# Patient Record
Sex: FEMALE | Race: WHITE | Employment: FULL TIME | URBAN - METROPOLITAN AREA
[De-identification: names, ages, dates, MRNs, and addresses within clinical notes are randomized per-mention and may not be internally consistent; named-entity substitution may affect disease eponyms.]

---

## 2017-04-11 ENCOUNTER — APPOINTMENT (OUTPATIENT)
Dept: OTHER | Age: 52
End: 2017-04-11
Attending: PREVENTIVE MEDICINE

## 2017-04-14 ENCOUNTER — APPOINTMENT (OUTPATIENT)
Dept: OTHER | Age: 52
End: 2017-04-14
Attending: PREVENTIVE MEDICINE

## 2018-02-27 ENCOUNTER — HOSPITAL ENCOUNTER (OUTPATIENT)
Age: 53
Discharge: HOME | End: 2018-02-27
Attending: FAMILY MEDICINE
Payer: COMMERCIAL

## 2018-02-27 ENCOUNTER — APPOINTMENT (OUTPATIENT)
Dept: GENERAL RADIOLOGY | Age: 53
End: 2018-02-27
Attending: FAMILY MEDICINE
Payer: COMMERCIAL

## 2018-02-27 VITALS
HEART RATE: 94 BPM | SYSTOLIC BLOOD PRESSURE: 108 MMHG | OXYGEN SATURATION: 94 % | DIASTOLIC BLOOD PRESSURE: 70 MMHG | TEMPERATURE: 101 F | RESPIRATION RATE: 20 BRPM

## 2018-02-27 DIAGNOSIS — J18.9 PNEUMONIA OF LEFT LUNG DUE TO INFECTIOUS ORGANISM, UNSPECIFIED PART OF LUNG: Primary | ICD-10-CM

## 2018-02-27 PROCEDURE — 71046 X-RAY EXAM CHEST 2 VIEWS: CPT | Performed by: FAMILY MEDICINE

## 2018-02-27 PROCEDURE — 99213 OFFICE O/P EST LOW 20 MIN: CPT

## 2018-02-27 PROCEDURE — 99214 OFFICE O/P EST MOD 30 MIN: CPT

## 2018-02-27 RX ORDER — OSELTAMIVIR PHOSPHATE 75 MG/1
75 CAPSULE ORAL 2 TIMES DAILY
Qty: 10 CAPSULE | Refills: 0 | Status: SHIPPED | OUTPATIENT
Start: 2018-02-27 | End: 2018-03-04

## 2018-02-27 RX ORDER — ACETAMINOPHEN 500 MG
1000 TABLET ORAL ONCE
Status: COMPLETED | OUTPATIENT
Start: 2018-02-27 | End: 2018-02-27

## 2018-02-27 RX ORDER — LEVOFLOXACIN 750 MG/1
750 TABLET ORAL DAILY
Qty: 7 TABLET | Refills: 0 | Status: SHIPPED | OUTPATIENT
Start: 2018-02-27 | End: 2018-03-06

## 2018-02-27 NOTE — ED PROVIDER NOTES
Patient Seen in: [address] [facility] [place]    History   Patient presents with:  Cough/URI    Stated Complaint: coughing fever body aches fatigue head pain    HPI    78-year-old female presented with chief complaint of fevers, sore throat, body ache Otherwise no respiratory distress, lungs clear to auscultation bilaterally.              ED Course   Labs Reviewed - No data to display    ED Course as of Feb 27 1608  ------------------------------------------------------------  Xr Chest Pa + Lat Chest (cp Impression:  Pneumonia of left lung due to infectious organism, unspecified part of lung  (primary encounter diagnosis)    Disposition:  Discharge  2/27/2018 10:05 am    Follow-up:  pcp    In 3 days          Medications Prescribed:  Discharge Medication Drake Weinberg

## 2018-03-02 ENCOUNTER — OFFICE VISIT (OUTPATIENT)
Dept: FAMILY MEDICINE CLINIC | Facility: CLINIC | Age: 53
End: 2018-03-02

## 2018-03-02 VITALS
HEART RATE: 64 BPM | WEIGHT: 149 LBS | HEIGHT: 62 IN | TEMPERATURE: 99 F | DIASTOLIC BLOOD PRESSURE: 80 MMHG | BODY MASS INDEX: 27.42 KG/M2 | RESPIRATION RATE: 16 BRPM | SYSTOLIC BLOOD PRESSURE: 118 MMHG

## 2018-03-02 DIAGNOSIS — J18.9 PNEUMONIA OF LEFT LOWER LOBE DUE TO INFECTIOUS ORGANISM: Primary | ICD-10-CM

## 2018-03-02 PROCEDURE — 99203 OFFICE O/P NEW LOW 30 MIN: CPT | Performed by: FAMILY MEDICINE

## 2018-03-02 RX ORDER — OMEPRAZOLE 40 MG/1
CAPSULE, DELAYED RELEASE ORAL
COMMUNITY
Start: 2017-05-22

## 2018-03-02 RX ORDER — ESTRADIOL 0.04 MG/D
0.38 FILM, EXTENDED RELEASE TRANSDERMAL
COMMUNITY
Start: 2017-06-08

## 2018-03-02 NOTE — PROGRESS NOTES
CC: pneumonia    HPI:     Location left lower   Quality consolidated  Severity moderate  Duration about a week  Context simultaneous treatment for flu  Associated symptoms feeling better    ROS:   GENERAL HEALTH: fevers noted - helped with tylenol  SKIN: d

## 2018-03-05 ENCOUNTER — TELEPHONE (OUTPATIENT)
Dept: FAMILY MEDICINE CLINIC | Facility: CLINIC | Age: 53
End: 2018-03-05

## 2018-03-05 NOTE — TELEPHONE ENCOUNTER
Patient seen 3/2/18 for pneumonia  Work note pended with dates 3/2/18 through Thursday with return date of Friday. Please advise.

## 2018-03-05 NOTE — TELEPHONE ENCOUNTER
Pt called stated she saw Dr. Samual Pallas on Friday and was wondering if she could get a note for work she has to be off of work all this week but can go back on Friday. PT would like us to fax this to her employer. Pt will call back with fax number.

## 2018-03-05 NOTE — TELEPHONE ENCOUNTER
Spoke with patient - read note to patient. Per patient - ok to fax. Patient is to be off work from 3/2/18 - 3/9/18  Patient has an appointment with Dr. Duc Naylor on 3/9 to see if she is good to go back to work.   Note faxed to [phone] attn Georgiana Buchanan

## 2018-03-05 NOTE — TELEPHONE ENCOUNTER
Pt calling back with fax number [phone] [employer]. Pt would like a call back and let her know what the letter states before faxing it to her employer.

## 2018-03-07 ENCOUNTER — TELEPHONE (OUTPATIENT)
Dept: FAMILY MEDICINE CLINIC | Facility: CLINIC | Age: 53
End: 2018-03-07

## 2018-03-07 NOTE — TELEPHONE ENCOUNTER
Pt called stated she saw Dr Noemy Buitrago last Friday for f/u pneumonia from the [address] [place] [address] and she did schedule a f/u for this Friday but he did tell her if her temp started up again this week to call. She does have a temp today of 100.

## 2018-03-07 NOTE — TELEPHONE ENCOUNTER
Per patient, has had normal temp since Thursday, until today. No new symptoms, had been feeling better but noticed today was tired again. Finished antibiotic Monday. Moved patient follow up to tomorrow. Patient will go to IC/ER if gets worse.   Future Eliud

## 2018-03-08 ENCOUNTER — OFFICE VISIT (OUTPATIENT)
Dept: FAMILY MEDICINE CLINIC | Facility: CLINIC | Age: 53
End: 2018-03-08

## 2018-03-08 ENCOUNTER — HOSPITAL ENCOUNTER (OUTPATIENT)
Dept: GENERAL RADIOLOGY | Age: 53
Discharge: HOME | End: 2018-03-08
Attending: FAMILY MEDICINE
Payer: COMMERCIAL

## 2018-03-08 VITALS
HEART RATE: 75 BPM | TEMPERATURE: 98 F | OXYGEN SATURATION: 96 % | HEIGHT: 62 IN | DIASTOLIC BLOOD PRESSURE: 72 MMHG | WEIGHT: 149 LBS | RESPIRATION RATE: 20 BRPM | BODY MASS INDEX: 27.42 KG/M2 | SYSTOLIC BLOOD PRESSURE: 108 MMHG

## 2018-03-08 DIAGNOSIS — J18.9 PNEUMONIA OF LEFT LOWER LOBE DUE TO INFECTIOUS ORGANISM: Primary | ICD-10-CM

## 2018-03-08 DIAGNOSIS — R50.81 FEVER IN OTHER DISEASES: ICD-10-CM

## 2018-03-08 DIAGNOSIS — J18.9 PNEUMONIA OF LEFT LOWER LOBE DUE TO INFECTIOUS ORGANISM: ICD-10-CM

## 2018-03-08 PROCEDURE — 99213 OFFICE O/P EST LOW 20 MIN: CPT | Performed by: FAMILY MEDICINE

## 2018-03-08 PROCEDURE — 71046 X-RAY EXAM CHEST 2 VIEWS: CPT | Performed by: FAMILY MEDICINE

## 2018-03-08 NOTE — PROGRESS NOTES
CC: fever    HPI:   Location spiked fever  Quality sudden  Severity moderate >100  Duration since yesterday  Context recently finished treatment for pneumonia    ROS: no vomiting or diarrhea, minimal chest pains, occ sob    Past Medical History:   [address]

## 2018-03-19 ENCOUNTER — OFFICE VISIT (OUTPATIENT)
Dept: FAMILY MEDICINE CLINIC | Facility: CLINIC | Age: 53
End: 2018-03-19

## 2018-03-19 VITALS
SYSTOLIC BLOOD PRESSURE: 92 MMHG | BODY MASS INDEX: 28 KG/M2 | RESPIRATION RATE: 16 BRPM | TEMPERATURE: 97 F | HEART RATE: 86 BPM | DIASTOLIC BLOOD PRESSURE: 58 MMHG | WEIGHT: 153 LBS | OXYGEN SATURATION: 97 %

## 2018-03-19 DIAGNOSIS — J06.9 VIRAL UPPER RESPIRATORY TRACT INFECTION: Primary | ICD-10-CM

## 2018-03-19 PROCEDURE — 99213 OFFICE O/P EST LOW 20 MIN: CPT | Performed by: FAMILY MEDICINE

## 2018-03-19 NOTE — PROGRESS NOTES
CC: congestion    HPI:    Location nasal  Quality pressure, drainage  Severity moderate  Duration several days  Associated symptoms some intermittent sore throat, some hoarse voice    ROS: no fever, some pain in chest on coughing, no sputum or hemoptysis,

## 2018-03-24 ENCOUNTER — HOSPITAL ENCOUNTER (OUTPATIENT)
Age: 53
Discharge: HOME | End: 2018-03-24
Attending: FAMILY MEDICINE
Payer: COMMERCIAL

## 2018-03-24 ENCOUNTER — APPOINTMENT (OUTPATIENT)
Dept: GENERAL RADIOLOGY | Age: 53
End: 2018-03-24
Attending: FAMILY MEDICINE
Payer: COMMERCIAL

## 2018-03-24 ENCOUNTER — APPOINTMENT (OUTPATIENT)
Dept: CT IMAGING | Age: 53
End: 2018-03-24
Attending: FAMILY MEDICINE
Payer: COMMERCIAL

## 2018-03-24 VITALS
TEMPERATURE: 98 F | RESPIRATION RATE: 16 BRPM | BODY MASS INDEX: 28 KG/M2 | WEIGHT: 153 LBS | DIASTOLIC BLOOD PRESSURE: 70 MMHG | OXYGEN SATURATION: 97 % | HEART RATE: 76 BPM | SYSTOLIC BLOOD PRESSURE: 120 MMHG

## 2018-03-24 DIAGNOSIS — J18.9 MULTIFOCAL PNEUMONIA: Primary | ICD-10-CM

## 2018-03-24 DIAGNOSIS — E04.1 THYROID NODULE: ICD-10-CM

## 2018-03-24 LAB
#LYMPHOCYTE IC: 1.4 X10ˆ3/UL (ref 0.9–3.2)
#MXD IC: 0.7 X10ˆ3/UL (ref 0.1–1)
#NEUTROPHIL IC: 4.7 X10ˆ3/UL (ref 1.3–6.7)
CREAT SERPL-MCNC: 0.7 MG/DL (ref 0.55–1.02)
GLUCOSE BLD-MCNC: 101 MG/DL (ref 70–99)
HCT IC: 37.2 % (ref 37–54)
HGB IC: 12.6 G/DL (ref 11.7–16)
ISTAT BLOOD GAS TCO2: 28 MMOL/L (ref 22–32)
ISTAT BUN: 11 MG/DL (ref 8–20)
ISTAT CHLORIDE: 101 MMOL/L (ref 101–111)
ISTAT HEMATOCRIT: 37 % (ref 34–50)
ISTAT IONIZED CALCIUM: 1.18 MMOL/L (ref 1.12–1.32)
ISTAT POTASSIUM: 4.2 MMOL/L (ref 3.6–5.1)
ISTAT SODIUM: 139 MMOL/L (ref 136–144)
LYMPHOCYTES NFR BLD AUTO: 20.8 %
MCH IC: 30.9 PG (ref 27–33.2)
MCHC IC: 33.9 G/DL (ref 31–37)
MCV IC: 91.2 FL (ref 81–100)
MIXED CELL %: 10.4 %
NEUTROPHILS NFR BLD AUTO: 68.8 %
PLT IC: 230 X10ˆ3/UL (ref 150–450)
RBC IC: 4.08 X10ˆ6/UL (ref 3.8–5.1)
WBC IC: 6.8 X10ˆ3/UL (ref 4–13)

## 2018-03-24 PROCEDURE — 99214 OFFICE O/P EST MOD 30 MIN: CPT

## 2018-03-24 PROCEDURE — 85025 COMPLETE CBC W/AUTO DIFF WBC: CPT | Performed by: FAMILY MEDICINE

## 2018-03-24 PROCEDURE — 80047 BASIC METABLC PNL IONIZED CA: CPT

## 2018-03-24 PROCEDURE — 36415 COLL VENOUS BLD VENIPUNCTURE: CPT

## 2018-03-24 PROCEDURE — 71046 X-RAY EXAM CHEST 2 VIEWS: CPT | Performed by: FAMILY MEDICINE

## 2018-03-24 PROCEDURE — 71260 CT THORAX DX C+: CPT | Performed by: FAMILY MEDICINE

## 2018-03-24 RX ORDER — BENZONATATE 200 MG/1
200 CAPSULE ORAL 3 TIMES DAILY PRN
Qty: 30 CAPSULE | Refills: 0 | Status: SHIPPED | OUTPATIENT
Start: 2018-03-24 | End: 2018-05-14 | Stop reason: ALTCHOICE

## 2018-03-24 RX ORDER — DOXYCYCLINE 100 MG/1
100 CAPSULE ORAL 2 TIMES DAILY
Qty: 10 CAPSULE | Refills: 0 | Status: SHIPPED | OUTPATIENT
Start: 2018-03-24 | End: 2018-03-24

## 2018-03-24 RX ORDER — AMOXICILLIN AND CLAVULANATE POTASSIUM 562.5; 437.5; 62.5 MG/1; MG/1; MG/1
TABLET, FILM COATED, EXTENDED RELEASE ORAL
Qty: 20 TABLET | Refills: 0 | Status: SHIPPED | OUTPATIENT
Start: 2018-03-24 | End: 2018-03-24

## 2018-03-24 RX ORDER — DOXYCYCLINE 100 MG/1
100 CAPSULE ORAL 2 TIMES DAILY
Qty: 10 CAPSULE | Refills: 0 | Status: SHIPPED | OUTPATIENT
Start: 2018-03-24 | End: 2018-03-29

## 2018-03-24 RX ORDER — BENZONATATE 200 MG/1
200 CAPSULE ORAL 3 TIMES DAILY PRN
Qty: 30 CAPSULE | Refills: 0 | Status: SHIPPED | OUTPATIENT
Start: 2018-03-24 | End: 2018-03-24

## 2018-03-24 RX ORDER — AMOXICILLIN AND CLAVULANATE POTASSIUM 562.5; 437.5; 62.5 MG/1; MG/1; MG/1
TABLET, FILM COATED, EXTENDED RELEASE ORAL
Qty: 20 TABLET | Refills: 0 | Status: SHIPPED | OUTPATIENT
Start: 2018-03-24 | End: 2018-03-30

## 2018-03-24 NOTE — ED INITIAL ASSESSMENT (HPI)
Pt had pneumonia at the end of February. Pt has followed up with her pcp 3 times since the dx and was told the pneumonia was improved/resolved and she may now have a viral cold. Pt taking otc medications this past week without relief.  The cough she now has

## 2018-03-24 NOTE — ED PROVIDER NOTES
Patient Seen in: [address] [facility]    History   Patient presents with:  Cough/URI    Stated Complaint: congestion, cough,    HPI    18-year-old female presents to the immediate care today with chief complaints of a persistent and nonproduct Resp 16   Wt 69.4 kg   SpO2 97%   BMI 27.98 kg/m²         Physical Exam    General appearance: alert, appears stated age and cooperative  Head: Normocephalic, without obvious abnormality, atraumatic  Eyes: conjunctivae/corneas clear. PERRL, EOM's intact.  Severo Pastures effusions. BONES:  Normal for age. CONCLUSION:  Left perihilar consolidation extending into the superior segment left lower lobe.      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 CONCLUSION:  1. Multiple patchy airspace opacities within bilateral upper lobes and superior segment left lower lobe with mild confluence of some of the opacities.   The appea DO  [facility]  [phone]    In 1 week  For re-check        Medications Prescribed:  Current Discharge Medication List    START taking these medications    Amoxicillin-Pot Clavulanate ER (AUGMENTIN XR) 1000-62.5 MG Oral Tablet 1

## 2018-03-26 NOTE — ED NOTES
Pt called and informed us that she is coughing up a lot of secretions, if she is short of breath to seek care sooner, she has a appt with Dr. Zeus Emerson in 4 days.

## 2018-03-28 ENCOUNTER — HOSPITAL ENCOUNTER (OUTPATIENT)
Age: 53
Discharge: HOME | End: 2018-03-28
Payer: COMMERCIAL

## 2018-03-28 VITALS
DIASTOLIC BLOOD PRESSURE: 66 MMHG | HEIGHT: 63 IN | WEIGHT: 150 LBS | SYSTOLIC BLOOD PRESSURE: 133 MMHG | TEMPERATURE: 98 F | HEART RATE: 63 BPM | OXYGEN SATURATION: 98 % | BODY MASS INDEX: 26.58 KG/M2 | RESPIRATION RATE: 18 BRPM

## 2018-03-28 DIAGNOSIS — J18.9 MULTIFOCAL PNEUMONIA: Primary | ICD-10-CM

## 2018-03-28 DIAGNOSIS — R06.2 WHEEZING: ICD-10-CM

## 2018-03-28 PROCEDURE — 99214 OFFICE O/P EST MOD 30 MIN: CPT

## 2018-03-28 PROCEDURE — 94640 AIRWAY INHALATION TREATMENT: CPT

## 2018-03-28 RX ORDER — IPRATROPIUM BROMIDE AND ALBUTEROL SULFATE 2.5; .5 MG/3ML; MG/3ML
3 SOLUTION RESPIRATORY (INHALATION) ONCE
Status: COMPLETED | OUTPATIENT
Start: 2018-03-28 | End: 2018-03-28

## 2018-03-28 RX ORDER — PREDNISONE 10 MG/1
TABLET ORAL
Qty: 30 TABLET | Refills: 0 | Status: SHIPPED | OUTPATIENT
Start: 2018-03-28 | End: 2018-04-06

## 2018-03-28 RX ORDER — ALBUTEROL SULFATE 2.5 MG/3ML
2.5 SOLUTION RESPIRATORY (INHALATION) EVERY 4 HOURS PRN
Qty: 30 AMPULE | Refills: 0 | Status: SHIPPED | OUTPATIENT
Start: 2018-03-28 | End: 2018-04-27

## 2018-03-28 NOTE — ED PROVIDER NOTES
Patient Seen in: [address] [facility]    History   Patient presents with:  Cough/URI  Pneumonia    Stated Complaint: still coughing not feeling better since last visit    HPI    CHIEF COMPLAINT: Continued cough     HISTORY OF PRESENT ILLNESS: and social history elements is as listed in today's nurse's notes are reviewed and agree. The patient's family history is reviewed and is noncontributory to the presenting problem, except as indicated as above.     Past Medical History:   Diagnosis Date   • masses. Neurological:  Grossly intact, no deficits. Cranial nerves are intact, 5 out of 5 symmetric upper and lower extremity motor strength. Gait normal.  Skin:  warm and dry, no rashes. No jaundice.  Brisk capillary refill  Musculoskeletal: neck is sup cough and had a fever yesterday. FINDINGS:  LUNGS:  Cardiac silhouette and pulmonary vasculature within normal limits. Improvement of left hilar increased opacity which extends to the lower lobe.   No new focal consolidation, pleural effusion, or pneumo [employer]) NRDR ([address]) which includes the Dose Index Registry. PATIENT STATED HISTORY:(As transcribed by Technologist)  Recurring pneumonia for one month. States wet/dry cough with chest congestion. No current fever. Biajn Cai DO            ED Course as of Mar 28 1114  ------------------------------------------------------------       [facility]       This is a well-appearing 64-year-old female with a recent diagnosis of pneumonia, who presents with complaint of persistent cough

## 2018-03-28 NOTE — ED INITIAL ASSESSMENT (HPI)
Cough seems a little worse since last visit, patient states is more harsh. And sometimes can hear wheezing from her upper chest. Not sleeping well. Cough spasms and feels like it 'takes her breath away'. No fevers.  Is still taking antibiotics as prescribed

## 2018-03-30 ENCOUNTER — OFFICE VISIT (OUTPATIENT)
Dept: FAMILY MEDICINE CLINIC | Facility: CLINIC | Age: 53
End: 2018-03-30

## 2018-03-30 VITALS
HEIGHT: 62 IN | WEIGHT: 151.38 LBS | TEMPERATURE: 98 F | HEART RATE: 66 BPM | RESPIRATION RATE: 16 BRPM | DIASTOLIC BLOOD PRESSURE: 68 MMHG | SYSTOLIC BLOOD PRESSURE: 102 MMHG | BODY MASS INDEX: 27.86 KG/M2 | OXYGEN SATURATION: 96 %

## 2018-03-30 DIAGNOSIS — J18.9 MULTIFOCAL PNEUMONIA: Primary | ICD-10-CM

## 2018-03-30 DIAGNOSIS — J98.01 BRONCHOSPASM: ICD-10-CM

## 2018-03-30 PROCEDURE — 99214 OFFICE O/P EST MOD 30 MIN: CPT | Performed by: FAMILY MEDICINE

## 2018-03-30 RX ORDER — CEFUROXIME AXETIL 500 MG/1
TABLET ORAL
Refills: 0 | COMMUNITY
Start: 2018-03-24 | End: 2018-04-06

## 2018-03-30 NOTE — PROGRESS NOTES
CC: cough    HPI:     Pt under treatment for multifocal pneumonia. She is using abx, po steroids, albuterol nebs. Cough very slow to improve. Moderate in severity. No associated hemoptysis. She is tired from all of this.        ROS: sleep better the last 2

## 2018-04-06 ENCOUNTER — OFFICE VISIT (OUTPATIENT)
Dept: FAMILY MEDICINE CLINIC | Facility: CLINIC | Age: 53
End: 2018-04-06

## 2018-04-06 VITALS
TEMPERATURE: 98 F | HEART RATE: 69 BPM | HEIGHT: 62 IN | OXYGEN SATURATION: 98 % | SYSTOLIC BLOOD PRESSURE: 112 MMHG | WEIGHT: 151 LBS | BODY MASS INDEX: 27.79 KG/M2 | RESPIRATION RATE: 18 BRPM | DIASTOLIC BLOOD PRESSURE: 72 MMHG

## 2018-04-06 DIAGNOSIS — E04.1 THYROID NODULE: ICD-10-CM

## 2018-04-06 DIAGNOSIS — J18.9 MULTIFOCAL PNEUMONIA: Primary | ICD-10-CM

## 2018-04-06 PROCEDURE — 99214 OFFICE O/P EST MOD 30 MIN: CPT | Performed by: FAMILY MEDICINE

## 2018-04-06 NOTE — PROGRESS NOTES
CC: follow up pneumonia and cough; ct scan result    HPI:   Overall she is starting to feel better. She has finished all oral medications.      CT:   Location rt hemithyroid  Quality nodular density  Severity at least 1cm  Duration newly discovered      ROS

## 2018-04-09 ENCOUNTER — HOSPITAL ENCOUNTER (OUTPATIENT)
Dept: ULTRASOUND IMAGING | Age: 53
Discharge: HOME | End: 2018-04-09
Attending: FAMILY MEDICINE
Payer: COMMERCIAL

## 2018-04-09 DIAGNOSIS — E04.1 THYROID NODULE: ICD-10-CM

## 2018-04-09 PROCEDURE — 76536 US EXAM OF HEAD AND NECK: CPT | Performed by: FAMILY MEDICINE

## 2018-04-10 DIAGNOSIS — E04.1 THYROID NODULE: Primary | ICD-10-CM

## 2018-04-10 NOTE — PROGRESS NOTES
Call tell the thyroid has a nodule seen on ultrasound that needs to be biopsied. I have ordered this procedure to be done in the interventional radiology department at [facility]. She just needs to call and schedule.  When biopsy results are back we wi

## 2018-04-13 ENCOUNTER — HOSPITAL ENCOUNTER (OUTPATIENT)
Age: 53
Discharge: HOME | End: 2018-04-13
Attending: FAMILY MEDICINE
Payer: COMMERCIAL

## 2018-04-13 VITALS
RESPIRATION RATE: 16 BRPM | HEART RATE: 78 BPM | DIASTOLIC BLOOD PRESSURE: 82 MMHG | WEIGHT: 150 LBS | SYSTOLIC BLOOD PRESSURE: 109 MMHG | TEMPERATURE: 98 F | BODY MASS INDEX: 27 KG/M2 | OXYGEN SATURATION: 98 %

## 2018-04-13 DIAGNOSIS — J98.01 BRONCHOSPASM: ICD-10-CM

## 2018-04-13 DIAGNOSIS — J01.00 ACUTE NON-RECURRENT MAXILLARY SINUSITIS: Primary | ICD-10-CM

## 2018-04-13 PROCEDURE — 99213 OFFICE O/P EST LOW 20 MIN: CPT

## 2018-04-13 PROCEDURE — 99214 OFFICE O/P EST MOD 30 MIN: CPT

## 2018-04-13 RX ORDER — BUDESONIDE AND FORMOTEROL FUMARATE DIHYDRATE 80; 4.5 UG/1; UG/1
AEROSOL RESPIRATORY (INHALATION) 2 TIMES DAILY
COMMUNITY
End: 2018-05-14 | Stop reason: ALTCHOICE

## 2018-04-13 RX ORDER — FLUTICASONE PROPIONATE 50 MCG
2 SPRAY, SUSPENSION (ML) NASAL DAILY
Qty: 16 G | Refills: 0 | Status: SHIPPED | OUTPATIENT
Start: 2018-04-13 | End: 2018-05-13

## 2018-04-13 RX ORDER — AZITHROMYCIN 250 MG/1
TABLET, FILM COATED ORAL
Qty: 1 PACKAGE | Refills: 0 | Status: SHIPPED | OUTPATIENT
Start: 2018-04-13 | End: 2018-04-19

## 2018-04-13 RX ORDER — ALBUTEROL SULFATE 90 UG/1
2 AEROSOL, METERED RESPIRATORY (INHALATION) EVERY 4 HOURS PRN
Qty: 1 INHALER | Refills: 0 | Status: SHIPPED | OUTPATIENT
Start: 2018-04-13 | End: 2018-05-13

## 2018-04-13 NOTE — ED PROVIDER NOTES
Patient Seen in: [address] [facility]    History   Patient presents with:  Cough/URI    Stated Complaint: sinus congestion    HPI    75-year-old female was recently treated for bilateral atypical pneumonia presents with 2 weeks of sinus pain/p dry  Neuro: A&O x3.  No focal deficits      ED Course   Labs Reviewed - No data to display    ED Course as of Apr 13 0912  ------------------------------------------------------------       MDM   Patient with sinus pain and pressure consistent with sinusiti

## 2018-04-19 ENCOUNTER — OFFICE VISIT (OUTPATIENT)
Dept: FAMILY MEDICINE CLINIC | Facility: CLINIC | Age: 53
End: 2018-04-19

## 2018-04-19 VITALS
OXYGEN SATURATION: 96 % | RESPIRATION RATE: 18 BRPM | BODY MASS INDEX: 27.97 KG/M2 | HEIGHT: 62 IN | DIASTOLIC BLOOD PRESSURE: 68 MMHG | WEIGHT: 152 LBS | TEMPERATURE: 98 F | SYSTOLIC BLOOD PRESSURE: 104 MMHG | HEART RATE: 85 BPM

## 2018-04-19 DIAGNOSIS — R05.9 COUGH: ICD-10-CM

## 2018-04-19 DIAGNOSIS — J01.90 ACUTE NON-RECURRENT SINUSITIS, UNSPECIFIED LOCATION: Primary | ICD-10-CM

## 2018-04-19 PROCEDURE — 94640 AIRWAY INHALATION TREATMENT: CPT | Performed by: FAMILY MEDICINE

## 2018-04-19 PROCEDURE — 99213 OFFICE O/P EST LOW 20 MIN: CPT | Performed by: FAMILY MEDICINE

## 2018-04-19 RX ORDER — ALBUTEROL SULFATE 2.5 MG/3ML
2.5 SOLUTION RESPIRATORY (INHALATION) ONCE
Status: COMPLETED | OUTPATIENT
Start: 2018-04-19 | End: 2018-04-19

## 2018-04-19 RX ADMIN — ALBUTEROL SULFATE 2.5 MG: 2.5 SOLUTION RESPIRATORY (INHALATION) at 15:32:00

## 2018-04-19 NOTE — PROGRESS NOTES
CC: follow up    HPI:     Recently diagnosed with sinusitis and bronchospasm at the . She has had multiple visits for illnesses/symptoms this year.  Currently she is feeling somewhat better than on her 4/13/18 visit where she was treated with azithromycin

## 2018-04-24 ENCOUNTER — HOSPITAL ENCOUNTER (OUTPATIENT)
Dept: ULTRASOUND IMAGING | Facility: HOSPITAL | Age: 53
Discharge: HOME | End: 2018-04-24
Attending: FAMILY MEDICINE
Payer: COMMERCIAL

## 2018-04-24 DIAGNOSIS — E04.1 THYROID NODULE: ICD-10-CM

## 2018-04-24 PROCEDURE — 10022 US FNA THYROID (CPT=10022/76942): CPT | Performed by: FAMILY MEDICINE

## 2018-04-24 PROCEDURE — 88173 CYTOPATH EVAL FNA REPORT: CPT | Performed by: FAMILY MEDICINE

## 2018-04-24 PROCEDURE — 76942 ECHO GUIDE FOR BIOPSY: CPT | Performed by: FAMILY MEDICINE

## 2018-04-24 NOTE — PROCEDURES
[facility]  Procedure Note    Sridhar Mcgee Patient Status:  Outpatient    1965 MRN [id number]   Location [address] [facility] Attending Zuleima Lang DO   Hosp Day # 0 PCP Arsenio Palacio, DO     Procedure: right thyroid nodule FNA    P

## 2018-04-26 ENCOUNTER — TELEPHONE (OUTPATIENT)
Dept: FAMILY MEDICINE CLINIC | Facility: CLINIC | Age: 53
End: 2018-04-26

## 2018-04-26 NOTE — TELEPHONE ENCOUNTER
Dr. Shayna Cline called regarding patient's thyroid aspiration. Suspicious of Hurthle cell neoplasm  Per Dr Shayna Cline it is recommended patient follow up with ENT to get part of thyroid removed to verify status.

## 2018-05-14 RX ORDER — CETIRIZINE HYDROCHLORIDE 10 MG/1
10 TABLET ORAL DAILY
COMMUNITY

## 2018-05-23 NOTE — H&P
[facility]    PATIENT'S NAME: Tyrese Reilly   ATTENDING PHYSICIAN: Carola Menard M.D.    PATIENT ACCOUNT#:   [de-identified]    LOCATION:  OR   [facility]  MEDICAL RECORD #:   [id number]       YOB: 1965  ADMISSION DATE:       05/31/2018

## 2018-05-29 ENCOUNTER — MED REC SCAN ONLY (OUTPATIENT)
Dept: FAMILY MEDICINE CLINIC | Facility: CLINIC | Age: 53
End: 2018-05-29

## 2018-05-29 RX ORDER — SODIUM CHLORIDE, SODIUM LACTATE, POTASSIUM CHLORIDE, CALCIUM CHLORIDE 600; 310; 30; 20 MG/100ML; MG/100ML; MG/100ML; MG/100ML
INJECTION, SOLUTION INTRAVENOUS CONTINUOUS
Status: CANCELLED | OUTPATIENT
Start: 2018-05-29

## 2018-05-29 NOTE — H&P
[facility]    PATIENT'S NAME: Adriano Resendez   ATTENDING PHYSICIAN: Abel Young M.D.    PATIENT ACCOUNT#:   [de-identified]    LOCATION:  OR   [facility]  MEDICAL RECORD #:   [id number]       YOB: 1965  ADMISSION DATE:       05/31/2018

## 2018-05-30 ENCOUNTER — ANESTHESIA EVENT (OUTPATIENT)
Dept: SURGERY | Facility: HOSPITAL | Age: 53
End: 2018-05-30
Payer: COMMERCIAL

## 2018-05-31 ENCOUNTER — ANESTHESIA (OUTPATIENT)
Dept: SURGERY | Facility: HOSPITAL | Age: 53
End: 2018-05-31
Payer: COMMERCIAL

## 2018-05-31 ENCOUNTER — HOSPITAL ENCOUNTER (OUTPATIENT)
Facility: HOSPITAL | Age: 53
Setting detail: HOSPITAL OUTPATIENT SURGERY
Discharge: HOME | End: 2018-05-31
Attending: OTOLARYNGOLOGY | Admitting: OTOLARYNGOLOGY
Payer: COMMERCIAL

## 2018-05-31 ENCOUNTER — SURGERY (OUTPATIENT)
Age: 53
End: 2018-05-31

## 2018-05-31 ENCOUNTER — APPOINTMENT (OUTPATIENT)
Dept: GENERAL RADIOLOGY | Facility: HOSPITAL | Age: 53
End: 2018-05-31
Attending: ANESTHESIOLOGY
Payer: COMMERCIAL

## 2018-05-31 VITALS
SYSTOLIC BLOOD PRESSURE: 135 MMHG | HEIGHT: 63 IN | RESPIRATION RATE: 20 BRPM | HEART RATE: 69 BPM | BODY MASS INDEX: 26.57 KG/M2 | WEIGHT: 149.94 LBS | DIASTOLIC BLOOD PRESSURE: 85 MMHG | OXYGEN SATURATION: 95 % | TEMPERATURE: 98 F

## 2018-05-31 PROCEDURE — 31575 DIAGNOSTIC LARYNGOSCOPY: CPT

## 2018-05-31 PROCEDURE — 0CJS8ZZ INSPECTION OF LARYNX, VIA NATURAL OR ARTIFICIAL OPENING ENDOSCOPIC: ICD-10-PCS | Performed by: OTOLARYNGOLOGY

## 2018-05-31 PROCEDURE — 0GBH0ZZ EXCISION OF RIGHT THYROID GLAND LOBE, OPEN APPROACH: ICD-10-PCS | Performed by: OTOLARYNGOLOGY

## 2018-05-31 PROCEDURE — 71045 X-RAY EXAM CHEST 1 VIEW: CPT | Performed by: ANESTHESIOLOGY

## 2018-05-31 RX ORDER — ACETAMINOPHEN 500 MG
1000 TABLET ORAL ONCE
COMMUNITY

## 2018-05-31 RX ORDER — HYDROCODONE BITARTRATE AND ACETAMINOPHEN 5; 325 MG/1; MG/1
1 TABLET ORAL AS NEEDED
Status: DISCONTINUED | OUTPATIENT
Start: 2018-05-31 | End: 2018-05-31

## 2018-05-31 RX ORDER — NALOXONE HYDROCHLORIDE 0.4 MG/ML
80 INJECTION, SOLUTION INTRAMUSCULAR; INTRAVENOUS; SUBCUTANEOUS AS NEEDED
Status: DISCONTINUED | OUTPATIENT
Start: 2018-05-31 | End: 2018-05-31

## 2018-05-31 RX ORDER — HYDROMORPHONE HYDROCHLORIDE 1 MG/ML
0.4 INJECTION, SOLUTION INTRAMUSCULAR; INTRAVENOUS; SUBCUTANEOUS EVERY 5 MIN PRN
Status: DISCONTINUED | OUTPATIENT
Start: 2018-05-31 | End: 2018-05-31

## 2018-05-31 RX ORDER — SODIUM CHLORIDE, SODIUM LACTATE, POTASSIUM CHLORIDE, CALCIUM CHLORIDE 600; 310; 30; 20 MG/100ML; MG/100ML; MG/100ML; MG/100ML
INJECTION, SOLUTION INTRAVENOUS CONTINUOUS
Status: DISCONTINUED | OUTPATIENT
Start: 2018-05-31 | End: 2018-05-31

## 2018-05-31 RX ORDER — MEPERIDINE HYDROCHLORIDE 25 MG/ML
12.5 INJECTION INTRAMUSCULAR; INTRAVENOUS; SUBCUTANEOUS AS NEEDED
Status: DISCONTINUED | OUTPATIENT
Start: 2018-05-31 | End: 2018-05-31

## 2018-05-31 RX ORDER — MIDAZOLAM HYDROCHLORIDE 1 MG/ML
1 INJECTION INTRAMUSCULAR; INTRAVENOUS EVERY 5 MIN PRN
Status: DISCONTINUED | OUTPATIENT
Start: 2018-05-31 | End: 2018-05-31

## 2018-05-31 RX ORDER — DEXAMETHASONE SODIUM PHOSPHATE 4 MG/ML
VIAL (ML) INJECTION
Status: COMPLETED
Start: 2018-05-31 | End: 2018-05-31

## 2018-05-31 RX ORDER — ACETAMINOPHEN 500 MG
1000 TABLET ORAL ONCE
Status: DISCONTINUED | OUTPATIENT
Start: 2018-05-31 | End: 2018-05-31 | Stop reason: HOSPADM

## 2018-05-31 RX ORDER — HYDROMORPHONE HYDROCHLORIDE 1 MG/ML
INJECTION, SOLUTION INTRAMUSCULAR; INTRAVENOUS; SUBCUTANEOUS
Status: COMPLETED
Start: 2018-05-31 | End: 2018-05-31

## 2018-05-31 RX ORDER — DEXAMETHASONE SODIUM PHOSPHATE 4 MG/ML
4 VIAL (ML) INJECTION AS NEEDED
Status: DISCONTINUED | OUTPATIENT
Start: 2018-05-31 | End: 2018-05-31

## 2018-05-31 RX ORDER — ONDANSETRON 2 MG/ML
4 INJECTION INTRAMUSCULAR; INTRAVENOUS AS NEEDED
Status: DISCONTINUED | OUTPATIENT
Start: 2018-05-31 | End: 2018-05-31

## 2018-05-31 RX ORDER — HYDROCODONE BITARTRATE AND ACETAMINOPHEN 5; 325 MG/1; MG/1
2 TABLET ORAL AS NEEDED
Status: DISCONTINUED | OUTPATIENT
Start: 2018-05-31 | End: 2018-05-31

## 2018-05-31 NOTE — INTERVAL H&P NOTE
Pre-op Diagnosis: HURTHLE FOLLICULAR NEOPLASM   hpv    The above referenced H&P was reviewed by Keely Jama MD on 5/31/2018, the patient was examined and no significant changes have occurred in the patient's condition since the H&P was performed.   I

## 2018-05-31 NOTE — ANESTHESIA POSTPROCEDURE EVALUATION
[facility] Patient Status:  Hospital Outpatient Surgery   Age/Gender 52year old female MRN [id number]   Location [address] [facility] Attending Bennett Samuels MD   Hosp Day # 0 PCP DO Anju Guthrie

## 2018-05-31 NOTE — PROGRESS NOTES
Difficult intubation with multiple attempts. Some mucosal bleeding during intubation and oropharynx Patient  was suctioned thoroughly. Patient was awaken from anesthesia and breathing spontaneously.  Dr. Pricila Tatum did a thorough check with fiberoptic scope a

## 2018-05-31 NOTE — INTERVAL H&P NOTE
Pre-op Diagnosis: HURTHLE FOLLICULAR NEOPLASM   The above referenced H&P was reviewed by Catracho Delaney MD on 5/31/2018, the patient was examined and no significant changes have occurred in the patient's condition since the H&P was performed.   Risks and

## 2018-05-31 NOTE — BRIEF OP NOTE
Pre-Operative Diagnosis: HURTHLE FOLLICULAR NEOPLASM      Post-Operative Diagnosis: * No post-op diagnosis entered *      Procedure Performed:   Procedure(s):  RIGHT THYROIDECTOMY AND ISTHMUSECTOMY     Surgeon(s) and Role:     * Mo Keenan MD - Dariel Casas

## 2018-05-31 NOTE — OPERATIVE REPORT
[facility]    PATIENT'S NAME: Rene Mau   ATTENDING PHYSICIAN: Daniel Hargrove M.D. OPERATING PHYSICIAN: Daniel Hargrove M.D.    PATIENT ACCOUNT#:   [de-identified]    LOCATION:  PACU 140[address] [facility] PACU 4 EDWP 10  MEDICAL RECORD #:   [id number]       JEROME

## 2018-05-31 NOTE — ANESTHESIA PREPROCEDURE EVALUATION
PRE-OP EVALUATION    Patient Name: Deborah Mcclellan    Pre-op Diagnosis: HURTHLE FOLLICULAR NEOPLASM     Procedure(s):  RIGHT THYROIDECTOMY AND ISTHMUSECTOMY     Surgeon(s) and Role:     * Chelsie Rivers MD - Primary     * Velasquez Garcia MD    Pre normal  (-) murmur   Dental    No notable dental history. Pulmonary    Pulmonary exam normal.  Breath sounds clear to auscultation bilaterally.                Other findings            ASA: 2   Plan: general  NPO status verified and patient meets gu

## 2018-06-04 RX ORDER — HYDROCODONE BITARTRATE AND ACETAMINOPHEN 5; 325 MG/1; MG/1
1 TABLET ORAL EVERY 6 HOURS PRN
COMMUNITY
End: 2018-10-29 | Stop reason: ALTCHOICE

## 2018-06-13 ENCOUNTER — MED REC SCAN ONLY (OUTPATIENT)
Dept: FAMILY MEDICINE CLINIC | Facility: CLINIC | Age: 53
End: 2018-06-13

## 2018-06-13 ENCOUNTER — ANESTHESIA EVENT (OUTPATIENT)
Dept: SURGERY | Facility: HOSPITAL | Age: 53
End: 2018-06-13
Payer: COMMERCIAL

## 2018-06-13 NOTE — ANESTHESIA PREPROCEDURE EVALUATION
PRE-OP EVALUATION    Patient Name: Sharifa Headings    Pre-op Diagnosis: HURTHLE FOLLICULAR NEOPLASM    Procedure(s):  RIGHT THYROIDECTOMY AND ISTHMUSECTOMY    Surgeon(s) and Role:     * Katt Cisneros MD - Primary     * Zohaib Russell MD - Assist Results  Component Value Date   MCV 91.2 03/24/2018   MCHC 33.9 03/24/2018               Airway      Mallampati: IV  Mouth opening: 3 FB  TM distance: 4 - 6 cm  Neck ROM: full Cardiovascular    Cardiovascular exam normal.         Dental    No notable denta

## 2018-06-14 ENCOUNTER — ANESTHESIA (OUTPATIENT)
Dept: SURGERY | Facility: HOSPITAL | Age: 53
End: 2018-06-14
Payer: COMMERCIAL

## 2018-06-14 ENCOUNTER — HOSPITAL ENCOUNTER (OUTPATIENT)
Facility: HOSPITAL | Age: 53
Setting detail: OBSERVATION
Discharge: HOME | End: 2018-06-15
Attending: OTOLARYNGOLOGY | Admitting: OTOLARYNGOLOGY
Payer: COMMERCIAL

## 2018-06-14 ENCOUNTER — SURGERY (OUTPATIENT)
Age: 53
End: 2018-06-14

## 2018-06-14 PROCEDURE — 4A11X4G MONITORING OF PERIPHERAL NERVOUS ELECTRICAL ACTIVITY, INTRAOPERATIVE, EXTERNAL APPROACH: ICD-10-PCS | Performed by: OTOLARYNGOLOGY

## 2018-06-14 PROCEDURE — 0GTH0ZZ RESECTION OF RIGHT THYROID GLAND LOBE, OPEN APPROACH: ICD-10-PCS | Performed by: OTOLARYNGOLOGY

## 2018-06-14 PROCEDURE — 0GTJ0ZZ RESECTION OF THYROID GLAND ISTHMUS, OPEN APPROACH: ICD-10-PCS | Performed by: OTOLARYNGOLOGY

## 2018-06-14 PROCEDURE — 88342 IMHCHEM/IMCYTCHM 1ST ANTB: CPT | Performed by: OTOLARYNGOLOGY

## 2018-06-14 PROCEDURE — 88331 PATH CONSLTJ SURG 1 BLK 1SPC: CPT | Performed by: OTOLARYNGOLOGY

## 2018-06-14 PROCEDURE — 88307 TISSUE EXAM BY PATHOLOGIST: CPT | Performed by: OTOLARYNGOLOGY

## 2018-06-14 PROCEDURE — 88341 IMHCHEM/IMCYTCHM EA ADD ANTB: CPT | Performed by: OTOLARYNGOLOGY

## 2018-06-14 RX ORDER — HYDROMORPHONE HYDROCHLORIDE 1 MG/ML
0.4 INJECTION, SOLUTION INTRAMUSCULAR; INTRAVENOUS; SUBCUTANEOUS EVERY 5 MIN PRN
Status: DISCONTINUED | OUTPATIENT
Start: 2018-06-14 | End: 2018-06-14 | Stop reason: HOSPADM

## 2018-06-14 RX ORDER — CALCIUM CARBONATE 500(1250)
1000 TABLET ORAL ONCE
Status: COMPLETED | OUTPATIENT
Start: 2018-06-14 | End: 2018-06-14

## 2018-06-14 RX ORDER — MORPHINE SULFATE 4 MG/ML
8 INJECTION, SOLUTION INTRAMUSCULAR; INTRAVENOUS EVERY 2 HOUR PRN
Status: DISCONTINUED | OUTPATIENT
Start: 2018-06-14 | End: 2018-06-15

## 2018-06-14 RX ORDER — ACETAMINOPHEN 500 MG
1000 TABLET ORAL ONCE
Status: DISCONTINUED | OUTPATIENT
Start: 2018-06-14 | End: 2018-06-14 | Stop reason: HOSPADM

## 2018-06-14 RX ORDER — HYDROMORPHONE HYDROCHLORIDE 1 MG/ML
INJECTION, SOLUTION INTRAMUSCULAR; INTRAVENOUS; SUBCUTANEOUS
Status: COMPLETED
Start: 2018-06-14 | End: 2018-06-14

## 2018-06-14 RX ORDER — NALOXONE HYDROCHLORIDE 0.4 MG/ML
80 INJECTION, SOLUTION INTRAMUSCULAR; INTRAVENOUS; SUBCUTANEOUS AS NEEDED
Status: DISCONTINUED | OUTPATIENT
Start: 2018-06-14 | End: 2018-06-14 | Stop reason: HOSPADM

## 2018-06-14 RX ORDER — HYDROMORPHONE HYDROCHLORIDE 2 MG/1
1 TABLET ORAL EVERY 2 HOUR PRN
Status: DISCONTINUED | OUTPATIENT
Start: 2018-06-14 | End: 2018-06-15

## 2018-06-14 RX ORDER — METOCLOPRAMIDE HYDROCHLORIDE 5 MG/ML
10 INJECTION INTRAMUSCULAR; INTRAVENOUS AS NEEDED
Status: DISCONTINUED | OUTPATIENT
Start: 2018-06-14 | End: 2018-06-14 | Stop reason: HOSPADM

## 2018-06-14 RX ORDER — HYDROCODONE BITARTRATE AND ACETAMINOPHEN 5; 325 MG/1; MG/1
2 TABLET ORAL AS NEEDED
Status: DISCONTINUED | OUTPATIENT
Start: 2018-06-14 | End: 2018-06-14 | Stop reason: HOSPADM

## 2018-06-14 RX ORDER — CALCIUM CARBONATE 500(1250)
1000 TABLET ORAL
Status: DISCONTINUED | OUTPATIENT
Start: 2018-06-14 | End: 2018-06-15

## 2018-06-14 RX ORDER — CALCITRIOL 0.25 UG/1
0.25 CAPSULE, LIQUID FILLED ORAL DAILY
Status: DISCONTINUED | OUTPATIENT
Start: 2018-06-14 | End: 2018-06-15

## 2018-06-14 RX ORDER — CALCITRIOL 0.25 UG/1
0.25 CAPSULE, LIQUID FILLED ORAL ONCE
Status: DISCONTINUED | OUTPATIENT
Start: 2018-06-14 | End: 2018-06-14 | Stop reason: HOSPADM

## 2018-06-14 RX ORDER — ACETAMINOPHEN 325 MG/1
650 TABLET ORAL EVERY 4 HOURS PRN
Status: DISCONTINUED | OUTPATIENT
Start: 2018-06-14 | End: 2018-06-15

## 2018-06-14 RX ORDER — ONDANSETRON 2 MG/ML
4 INJECTION INTRAMUSCULAR; INTRAVENOUS EVERY 6 HOURS PRN
Status: DISCONTINUED | OUTPATIENT
Start: 2018-06-14 | End: 2018-06-15

## 2018-06-14 RX ORDER — DEXTROSE, SODIUM CHLORIDE, SODIUM LACTATE, POTASSIUM CHLORIDE, AND CALCIUM CHLORIDE 5; .6; .31; .03; .02 G/100ML; G/100ML; G/100ML; G/100ML; G/100ML
INJECTION, SOLUTION INTRAVENOUS CONTINUOUS
Status: DISCONTINUED | OUTPATIENT
Start: 2018-06-14 | End: 2018-06-15

## 2018-06-14 RX ORDER — LIDOCAINE HYDROCHLORIDE AND EPINEPHRINE 10; 10 MG/ML; UG/ML
INJECTION, SOLUTION INFILTRATION; PERINEURAL AS NEEDED
Status: DISCONTINUED | OUTPATIENT
Start: 2018-06-14 | End: 2018-06-14 | Stop reason: HOSPADM

## 2018-06-14 RX ORDER — SODIUM CHLORIDE, SODIUM LACTATE, POTASSIUM CHLORIDE, CALCIUM CHLORIDE 600; 310; 30; 20 MG/100ML; MG/100ML; MG/100ML; MG/100ML
INJECTION, SOLUTION INTRAVENOUS CONTINUOUS
Status: DISCONTINUED | OUTPATIENT
Start: 2018-06-14 | End: 2018-06-14 | Stop reason: HOSPADM

## 2018-06-14 RX ORDER — CALCITRIOL 0.25 UG/1
CAPSULE, LIQUID FILLED ORAL
Status: COMPLETED
Start: 2018-06-14 | End: 2018-06-14

## 2018-06-14 RX ORDER — SCOLOPAMINE TRANSDERMAL SYSTEM 1 MG/1
1 PATCH, EXTENDED RELEASE TRANSDERMAL
Status: DISCONTINUED | OUTPATIENT
Start: 2018-06-14 | End: 2018-06-16

## 2018-06-14 RX ORDER — MORPHINE SULFATE 4 MG/ML
4 INJECTION, SOLUTION INTRAMUSCULAR; INTRAVENOUS EVERY 2 HOUR PRN
Status: DISCONTINUED | OUTPATIENT
Start: 2018-06-14 | End: 2018-06-15

## 2018-06-14 RX ORDER — ZOLPIDEM TARTRATE 5 MG/1
5 TABLET ORAL NIGHTLY PRN
Status: DISCONTINUED | OUTPATIENT
Start: 2018-06-14 | End: 2018-06-15

## 2018-06-14 RX ORDER — HYDROMORPHONE HYDROCHLORIDE 4 MG/1
2 TABLET ORAL EVERY 2 HOUR PRN
Status: DISCONTINUED | OUTPATIENT
Start: 2018-06-14 | End: 2018-06-15

## 2018-06-14 RX ORDER — MORPHINE SULFATE 4 MG/ML
2 INJECTION, SOLUTION INTRAMUSCULAR; INTRAVENOUS EVERY 2 HOUR PRN
Status: DISCONTINUED | OUTPATIENT
Start: 2018-06-14 | End: 2018-06-15

## 2018-06-14 RX ORDER — HYDROCODONE BITARTRATE AND ACETAMINOPHEN 5; 325 MG/1; MG/1
2 TABLET ORAL EVERY 4 HOURS PRN
Status: DISCONTINUED | OUTPATIENT
Start: 2018-06-14 | End: 2018-06-15

## 2018-06-14 RX ORDER — HYDROCODONE BITARTRATE AND ACETAMINOPHEN 5; 325 MG/1; MG/1
1 TABLET ORAL AS NEEDED
Status: DISCONTINUED | OUTPATIENT
Start: 2018-06-14 | End: 2018-06-14 | Stop reason: HOSPADM

## 2018-06-14 RX ORDER — CALCIUM CARBONATE 500(1250)
TABLET ORAL
Status: COMPLETED
Start: 2018-06-14 | End: 2018-06-14

## 2018-06-14 RX ORDER — ONDANSETRON 2 MG/ML
4 INJECTION INTRAMUSCULAR; INTRAVENOUS AS NEEDED
Status: DISCONTINUED | OUTPATIENT
Start: 2018-06-14 | End: 2018-06-14 | Stop reason: HOSPADM

## 2018-06-14 RX ORDER — DEXAMETHASONE SODIUM PHOSPHATE 4 MG/ML
4 VIAL (ML) INJECTION AS NEEDED
Status: DISCONTINUED | OUTPATIENT
Start: 2018-06-14 | End: 2018-06-14 | Stop reason: HOSPADM

## 2018-06-14 RX ORDER — HYDROCODONE BITARTRATE AND ACETAMINOPHEN 5; 325 MG/1; MG/1
1 TABLET ORAL EVERY 4 HOURS PRN
Status: DISCONTINUED | OUTPATIENT
Start: 2018-06-14 | End: 2018-06-15

## 2018-06-14 RX ORDER — PANTOPRAZOLE SODIUM 20 MG/1
20 TABLET, DELAYED RELEASE ORAL
Status: DISCONTINUED | OUTPATIENT
Start: 2018-06-15 | End: 2018-06-15

## 2018-06-14 RX ORDER — HYDROMORPHONE HYDROCHLORIDE 4 MG/1
4 TABLET ORAL EVERY 2 HOUR PRN
Status: DISCONTINUED | OUTPATIENT
Start: 2018-06-14 | End: 2018-06-15

## 2018-06-14 RX ORDER — METOCLOPRAMIDE HYDROCHLORIDE 5 MG/ML
10 INJECTION INTRAMUSCULAR; INTRAVENOUS EVERY 6 HOURS PRN
Status: DISCONTINUED | OUTPATIENT
Start: 2018-06-14 | End: 2018-06-15

## 2018-06-14 NOTE — INTERVAL H&P NOTE
Pre-op Diagnosis: HURTHLE FOLLICULAR NEOPLASM  The above referenced H&P was reviewed by Galo Suh MD on 6/14/2018, the patient was examined and no significant changes have occurred in the patient's condition since the H&P was performed.   Risks and

## 2018-06-14 NOTE — BRIEF OP NOTE
Pre-Operative Diagnosis: HURTHLE FOLLICULAR NEOPLASM     Post-Operative Diagnosis: HURTHLE FOLLICULAR NEOPLASM      Procedure Performed:   Procedure(s):  RIGHT THYROIDECTOMY AND ISTHMUSECTOMY    Surgeon(s) and Role:     * Karie Fine MD - Primary

## 2018-06-14 NOTE — ANESTHESIA POSTPROCEDURE EVALUATION
[facility] Patient Status:  Hospital Outpatient Surgery   Age/Gender 52year old female MRN [id number]   Location [address] [facility] Attending Wanda Jackson MD   Hosp Day # 0 PCP DO Anju Larson

## 2018-06-14 NOTE — H&P (VIEW-ONLY)
[facility]    PATIENT'S NAME: Manuel Sandoval   ATTENDING PHYSICIAN: Zach Garzon M.D.    PATIENT ACCOUNT#:   [de-identified]    LOCATION:  OR   [facility]  MEDICAL RECORD #:   [id number]       YOB: 1965  ADMISSION DATE:       05/31/2018

## 2018-06-15 VITALS
SYSTOLIC BLOOD PRESSURE: 110 MMHG | DIASTOLIC BLOOD PRESSURE: 63 MMHG | OXYGEN SATURATION: 95 % | WEIGHT: 146.63 LBS | HEIGHT: 63 IN | RESPIRATION RATE: 18 BRPM | HEART RATE: 54 BPM | BODY MASS INDEX: 25.98 KG/M2 | TEMPERATURE: 99 F

## 2018-06-15 PROCEDURE — 82310 ASSAY OF CALCIUM: CPT | Performed by: OTOLARYNGOLOGY

## 2018-06-15 NOTE — PLAN OF CARE
GASTROINTESTINAL - ADULT    • Minimal or absence of nausea and vomiting Adequate for Discharge        Patient/Family Goals    • Patient/Family Long Term Goal Adequate for Discharge    • Patient/Family Short Term Goal Adequate for Discharge        SKIN/TISS

## 2018-06-15 NOTE — PAYOR COMM NOTE
--------------  ADMISSION REVIEW     Payor: [facility] PPO  Subscriber #:  [id number]  Authorization Number: [id number]/A    Admit date:6/14/18      Admitting Physician: Katt Cisneros MD  Attending Physician:  Katt Cisneros MD  Primary Care Physician: Marylee Collie Action Dose Route User    6/15/2018 0636 Given 20 mg Oral Anjali Flood, RN

## 2018-06-15 NOTE — PLAN OF CARE
GASTROINTESTINAL - ADULT    • Minimal or absence of nausea and vomiting Progressing        Patient/Family Goals    • Patient/Family Long Term Goal Progressing    • Patient/Family Short Term Goal Progressing        SKIN/TISSUE INTEGRITY - ADULT    • [place]

## 2018-06-15 NOTE — OPERATIVE REPORT
[facility]    PATIENT'S NAME: Shannan Gomez   ATTENDING PHYSICIAN: Slade Stephenson M.D. OPERATING PHYSICIAN: Slade Stephenson M.D.    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 Irrigation, hemostasis, and then layered closure was performed of the wound closing the skin with 4-0 Prolene suture in running subcuticular fashion. The patient had 10 mL blood loss.   No comp

## 2018-06-15 NOTE — PROGRESS NOTES
Post op day 1  Patient doing well. Tolerating diet. Voice WNL. Wound no hematoma. Calcium 10.3  Discharge to home with pain meds as tolerated, po calcium. Follow up 1 week for suture removal.  Patient was given post op instruction sheet.  Instructed to harriett

## 2018-06-28 ENCOUNTER — OFFICE VISIT (OUTPATIENT)
Dept: FAMILY MEDICINE CLINIC | Facility: CLINIC | Age: 53
End: 2018-06-28

## 2018-06-28 VITALS
HEIGHT: 62 IN | WEIGHT: 146 LBS | BODY MASS INDEX: 26.87 KG/M2 | OXYGEN SATURATION: 98 % | SYSTOLIC BLOOD PRESSURE: 110 MMHG | HEART RATE: 68 BPM | TEMPERATURE: 99 F | RESPIRATION RATE: 18 BRPM | DIASTOLIC BLOOD PRESSURE: 64 MMHG

## 2018-06-28 DIAGNOSIS — S11.21XD: ICD-10-CM

## 2018-06-28 DIAGNOSIS — D34 HURTHLE CELL NEOPLASM OF THYROID: Primary | ICD-10-CM

## 2018-06-28 PROCEDURE — 84443 ASSAY THYROID STIM HORMONE: CPT | Performed by: FAMILY MEDICINE

## 2018-06-28 PROCEDURE — 84439 ASSAY OF FREE THYROXINE: CPT | Performed by: FAMILY MEDICINE

## 2018-06-28 PROCEDURE — 99213 OFFICE O/P EST LOW 20 MIN: CPT | Performed by: FAMILY MEDICINE

## 2018-06-28 PROCEDURE — 36415 COLL VENOUS BLD VENIPUNCTURE: CPT | Performed by: FAMILY MEDICINE

## 2018-06-28 PROCEDURE — 82310 ASSAY OF CALCIUM: CPT | Performed by: FAMILY MEDICINE

## 2018-06-28 NOTE — PROGRESS NOTES
CC: follow up thyroid surgery    HPI:     Pt had complicated course with first surgical date with apparently sustaining pharyngeal/laryngeal laceration on intubation. Surgery was cancelled and then rescheduled for 6/14.  She had a successful surgery on that

## 2018-09-17 ENCOUNTER — TELEPHONE (OUTPATIENT)
Dept: FAMILY MEDICINE CLINIC | Facility: CLINIC | Age: 53
End: 2018-09-17

## 2018-09-18 ENCOUNTER — HOSPITAL ENCOUNTER (OUTPATIENT)
Dept: GENERAL RADIOLOGY | Age: 53
Discharge: HOME | End: 2018-09-18
Payer: COMMERCIAL

## 2018-09-18 ENCOUNTER — LAB ENCOUNTER (OUTPATIENT)
Dept: LAB | Age: 53
End: 2018-09-18
Payer: COMMERCIAL

## 2018-09-18 DIAGNOSIS — H30.90 POSTERIOR UVEITIS: ICD-10-CM

## 2018-09-18 DIAGNOSIS — H30.90 POSTERIOR UVEITIS: Primary | ICD-10-CM

## 2018-09-18 LAB
BASOPHILS # BLD AUTO: 0.02 X10(3) UL (ref 0–0.1)
BASOPHILS NFR BLD AUTO: 0.4 %
EOSINOPHIL # BLD AUTO: 0.14 X10(3) UL (ref 0–0.3)
EOSINOPHIL NFR BLD AUTO: 2.9 %
ERYTHROCYTE [DISTWIDTH] IN BLOOD BY AUTOMATED COUNT: 12.5 % (ref 11.5–16)
HCT VFR BLD AUTO: 39.5 % (ref 34–50)
HGB BLD-MCNC: 12.6 G/DL (ref 12–16)
IMMATURE GRANULOCYTE COUNT: 0.02 X10(3) UL (ref 0–1)
IMMATURE GRANULOCYTE RATIO %: 0.4 %
LYMPHOCYTES # BLD AUTO: 1.63 X10(3) UL (ref 0.9–4)
LYMPHOCYTES NFR BLD AUTO: 34.1 %
MCH RBC QN AUTO: 30 PG (ref 27–33.2)
MCHC RBC AUTO-ENTMCNC: 31.9 G/DL (ref 31–37)
MCV RBC AUTO: 94 FL (ref 81–100)
MONOCYTES # BLD AUTO: 0.39 X10(3) UL (ref 0.1–1)
MONOCYTES NFR BLD AUTO: 8.2 %
NEUTROPHIL ABS PRELIM: 2.58 X10 (3) UL (ref 1.3–6.7)
NEUTROPHILS # BLD AUTO: 2.58 X10(3) UL (ref 1.3–6.7)
NEUTROPHILS NFR BLD AUTO: 54 %
PLATELET # BLD AUTO: 247 10(3)UL (ref 150–450)
RBC # BLD AUTO: 4.2 X10(6)UL (ref 3.8–5.1)
RED CELL DISTRIBUTION WIDTH-SD: 43.3 FL (ref 35.1–46.3)
WBC # BLD AUTO: 4.8 X10(3) UL (ref 4–13)

## 2018-09-18 PROCEDURE — 85549 MURAMIDASE: CPT

## 2018-09-18 PROCEDURE — 86480 TB TEST CELL IMMUN MEASURE: CPT

## 2018-09-18 PROCEDURE — 85025 COMPLETE CBC W/AUTO DIFF WBC: CPT

## 2018-09-18 PROCEDURE — 86780 TREPONEMA PALLIDUM: CPT

## 2018-09-18 PROCEDURE — 82164 ANGIOTENSIN I ENZYME TEST: CPT

## 2018-09-18 PROCEDURE — 71046 X-RAY EXAM CHEST 2 VIEWS: CPT | Performed by: OPHTHALMOLOGY

## 2018-09-18 PROCEDURE — 36415 COLL VENOUS BLD VENIPUNCTURE: CPT

## 2018-09-19 LAB
ANGIOTENSIN CONVERTING ENZYME: 29 U/L
TREPONEMA PALLIDUM AB, IGG BY IFA (FTA-ABS), SERUM: NON REACTIVE

## 2018-09-20 ENCOUNTER — LAB ENCOUNTER (OUTPATIENT)
Dept: LAB | Age: 53
End: 2018-09-20
Payer: COMMERCIAL

## 2018-09-21 LAB
LYSOZYME, SERUM OR BODY FLUID: 0.74 UG/ML
M TB TUBERC IFN-G BLD QL: NEGATIVE
M TB TUBERC IFN-G/MITOGEN IGNF BLD: 0.02 IU/ML
M TB TUBERC IGNF/MITOGEN IGNF CONTROL: >10 IU/ML
MITOGEN IGNF BCKGRD COR BLD-ACNC: 0.55 IU/ML

## 2018-10-04 ENCOUNTER — TELEPHONE (OUTPATIENT)
Dept: FAMILY MEDICINE CLINIC | Facility: CLINIC | Age: 53
End: 2018-10-04

## 2018-10-29 ENCOUNTER — HOSPITAL ENCOUNTER (OUTPATIENT)
Age: 53
Discharge: HOME | End: 2018-10-29
Attending: EMERGENCY MEDICINE
Payer: COMMERCIAL

## 2018-10-29 VITALS
WEIGHT: 143 LBS | SYSTOLIC BLOOD PRESSURE: 122 MMHG | BODY MASS INDEX: 25.34 KG/M2 | OXYGEN SATURATION: 98 % | HEIGHT: 63 IN | HEART RATE: 63 BPM | TEMPERATURE: 97 F | RESPIRATION RATE: 16 BRPM | DIASTOLIC BLOOD PRESSURE: 74 MMHG

## 2018-10-29 DIAGNOSIS — J01.00 ACUTE NON-RECURRENT MAXILLARY SINUSITIS: Primary | ICD-10-CM

## 2018-10-29 PROCEDURE — 99214 OFFICE O/P EST MOD 30 MIN: CPT

## 2018-10-29 PROCEDURE — 99213 OFFICE O/P EST LOW 20 MIN: CPT

## 2018-10-29 RX ORDER — AZITHROMYCIN 250 MG/1
TABLET, FILM COATED ORAL
Qty: 1 PACKAGE | Refills: 0 | Status: SHIPPED | OUTPATIENT
Start: 2018-10-29 | End: 2018-11-03

## 2018-10-29 RX ORDER — MELATONIN
1000 DAILY
COMMUNITY

## 2018-10-29 NOTE — ED PROVIDER NOTES
Patient presents with:  Sinusitis    HPI:     Ej Henley is a 52year old female who presents with chief complaint of congestion x 2.5 weeks. Started 2.5 weeks ago as what pt thought was allergies.   Now it continues to persist with daily R maxillary NSAID/APAP, antihistamine-D  3. F/u with PCP in 7 days as needed for re-evaluation, sooner if symptoms worsen      All results reviewed and discussed with patient. See AVS for detailed discharge instructions.

## 2019-03-21 ENCOUNTER — TELEPHONE (OUTPATIENT)
Dept: FAMILY MEDICINE CLINIC | Facility: CLINIC | Age: 54
End: 2019-03-21

## 2019-03-23 ENCOUNTER — HOSPITAL ENCOUNTER (OUTPATIENT)
Age: 54
Discharge: HOME | End: 2019-03-23
Payer: COMMERCIAL

## 2019-03-23 ENCOUNTER — APPOINTMENT (OUTPATIENT)
Dept: GENERAL RADIOLOGY | Age: 54
End: 2019-03-23
Attending: PHYSICIAN ASSISTANT
Payer: COMMERCIAL

## 2019-03-23 VITALS
TEMPERATURE: 97 F | WEIGHT: 140 LBS | HEIGHT: 63 IN | SYSTOLIC BLOOD PRESSURE: 127 MMHG | BODY MASS INDEX: 24.8 KG/M2 | RESPIRATION RATE: 16 BRPM | HEART RATE: 58 BPM | OXYGEN SATURATION: 96 % | DIASTOLIC BLOOD PRESSURE: 61 MMHG

## 2019-03-23 DIAGNOSIS — J01.00 ACUTE NON-RECURRENT MAXILLARY SINUSITIS: Primary | ICD-10-CM

## 2019-03-23 PROCEDURE — 99213 OFFICE O/P EST LOW 20 MIN: CPT

## 2019-03-23 PROCEDURE — 71046 X-RAY EXAM CHEST 2 VIEWS: CPT | Performed by: PHYSICIAN ASSISTANT

## 2019-03-23 PROCEDURE — 99214 OFFICE O/P EST MOD 30 MIN: CPT

## 2019-03-23 RX ORDER — FLUTICASONE PROPIONATE 50 MCG
2 SPRAY, SUSPENSION (ML) NASAL DAILY
Qty: 16 G | Refills: 0 | Status: SHIPPED | OUTPATIENT
Start: 2019-03-23 | End: 2019-04-22

## 2019-03-23 RX ORDER — DOXYCYCLINE HYCLATE 100 MG
100 TABLET ORAL 2 TIMES DAILY
Qty: 14 TABLET | Refills: 0 | Status: SHIPPED | OUTPATIENT
Start: 2019-03-23 | End: 2019-03-30

## 2019-03-23 NOTE — ED PROVIDER NOTES
Patient Seen in: [address] [facility]    History   Patient presents with:  Cough/URI    Stated Complaint: Cough fever congestion headache    HPI    CHIEF COMPLAINT: Sinus congestion for the past 2 weeks, cough for the past 4 days     HISTORY O PONV (postoperative nausea and vomiting)    • Problems with swallowing     DUE TO PROBLEMATIC INTUBATION 6/4/18   • Valvular disease     patient denies   • Visual impairment     contacts       Past Surgical History:   Procedure Laterality Date   • APPENDEC moist.  Respiratory: there are no retractions, lungs are clear to auscultation  Cardiovascular: regular rate and rhythm  Gastrointestinal:  abdomen is soft and non tender, no masses, bowel sounds normal.  No rebound, guarding or rigidity noted.   No abdomin Kristian Castillo MD                [facility]     This is a well-appearing 78-year-old female with stable vital signs who presents with 2 weeks of sinus congestion and 4 days of cough.   Patient had concern for pneumonia and given that she had a cough associated

## 2019-09-19 ENCOUNTER — TELEPHONE (OUTPATIENT)
Dept: FAMILY MEDICINE CLINIC | Facility: CLINIC | Age: 54
End: 2019-09-19

## 2019-12-03 ENCOUNTER — HOSPITAL ENCOUNTER (OUTPATIENT)
Age: 54
Discharge: HOME | End: 2019-12-03
Payer: COMMERCIAL

## 2019-12-03 ENCOUNTER — APPOINTMENT (OUTPATIENT)
Dept: GENERAL RADIOLOGY | Age: 54
End: 2019-12-03
Attending: NURSE PRACTITIONER
Payer: COMMERCIAL

## 2019-12-03 VITALS
DIASTOLIC BLOOD PRESSURE: 77 MMHG | HEART RATE: 66 BPM | SYSTOLIC BLOOD PRESSURE: 121 MMHG | RESPIRATION RATE: 18 BRPM | TEMPERATURE: 99 F | OXYGEN SATURATION: 100 %

## 2019-12-03 DIAGNOSIS — J40 BRONCHITIS: Primary | ICD-10-CM

## 2019-12-03 DIAGNOSIS — J01.00 ACUTE MAXILLARY SINUSITIS, RECURRENCE NOT SPECIFIED: ICD-10-CM

## 2019-12-03 PROCEDURE — 71046 X-RAY EXAM CHEST 2 VIEWS: CPT | Performed by: NURSE PRACTITIONER

## 2019-12-03 PROCEDURE — 99214 OFFICE O/P EST MOD 30 MIN: CPT

## 2019-12-03 PROCEDURE — 99213 OFFICE O/P EST LOW 20 MIN: CPT

## 2019-12-03 RX ORDER — BENZONATATE 100 MG/1
100 CAPSULE ORAL 3 TIMES DAILY PRN
Qty: 30 CAPSULE | Refills: 0 | Status: SHIPPED | OUTPATIENT
Start: 2019-12-03 | End: 2020-01-02

## 2019-12-03 RX ORDER — PREDNISONE 20 MG/1
20 TABLET ORAL DAILY
Qty: 5 TABLET | Refills: 0 | Status: SHIPPED | OUTPATIENT
Start: 2019-12-03 | End: 2019-12-08

## 2019-12-03 NOTE — ED INITIAL ASSESSMENT (HPI)
Patient c/o hoarse voice, chest congestion and cough for 4 days. States did have similar symptoms approximately 3 weeks ago that resolved. Denies fevers.

## 2019-12-03 NOTE — ED PROVIDER NOTES
Patient Seen in: [facility] [place]      History   Patient presents with:  Hoarseness  Chest Congestion  Cough    Stated Complaint: cold symptoms    HPI  Patient is 57-year-old female past medical history of Dave's esophagus, esophageal r problem.     Social History    Tobacco Use      Smoking status: Former Smoker        Packs/day: 1.00        Years: 33.00        Pack years: 35        Quit date: 2013        Years since quittin.1      Smokeless tobacco: Never Used      Tobacco comme Cardiovascular:      Rate and Rhythm: Normal rate and regular rhythm. Pulses: Normal pulses. Heart sounds: Normal heart sounds. No murmur. No friction rub. No gallop.     Pulmonary:      Effort: Pulmonary effort is normal. No respiratory distres care for sinusitis. follow-up with PCP or seek immediate medical attention for persistent worsening symptoms.                   Disposition and Plan     Clinical Impression:  Bronchitis  (primary encounter diagnosis)  Acute maxillary sinusitis, recurrence n

## 2020-01-07 ENCOUNTER — TELEPHONE (OUTPATIENT)
Dept: FAMILY MEDICINE CLINIC | Facility: CLINIC | Age: 55
End: 2020-01-07

## 2022-12-31 NOTE — ED INITIAL ASSESSMENT (HPI)
Sinus congestion for 2 weeks, this past Tuesday broke to her chest, fever this past Wednesday, highest 102.0F No pallor, no cervical/supraclavicular/inguinal adenopathy.  No splenomegaly

## 2023-08-03 NOTE — ED INITIAL ASSESSMENT (HPI)
Attempted patient's mother. Left v/m for mother to contact the clinic.  --------------------------------------------------------------------------  ----- Message from Bladimir Altamirano sent at 8/3/2023  3:00 PM CDT -----  Type:  Patient Returning Call    Who Called:mother  Who Left Message for Patient:Nadine  Does the patient know what this is regarding?:returning a call  Would the patient rather a call back or a response via [facility]? call  Best Call Back Number:[phone]   Additional Information:        Patient has had congestion for 2.5 weeks that is not improving. She feels most of the congestion to the right sinus area. She has a lot of pressure and even feels it in her teeth. No fevers. She has tried OTC allergy meds without improvement.

## 2023-10-04 ENCOUNTER — APPOINTMENT (OUTPATIENT)
Dept: GENERAL RADIOLOGY | Age: 58
End: 2023-10-04
Attending: NURSE PRACTITIONER
Payer: OTHER MISCELLANEOUS

## 2023-10-04 ENCOUNTER — HOSPITAL ENCOUNTER (OUTPATIENT)
Age: 58
Discharge: HOME | End: 2023-10-04
Payer: OTHER MISCELLANEOUS

## 2023-10-04 VITALS
SYSTOLIC BLOOD PRESSURE: 138 MMHG | RESPIRATION RATE: 16 BRPM | HEART RATE: 65 BPM | TEMPERATURE: 98 F | OXYGEN SATURATION: 100 % | DIASTOLIC BLOOD PRESSURE: 86 MMHG

## 2023-10-04 DIAGNOSIS — S83.92XA SPRAIN OF LEFT KNEE, UNSPECIFIED LIGAMENT, INITIAL ENCOUNTER: Primary | ICD-10-CM

## 2023-10-04 PROCEDURE — 99203 OFFICE O/P NEW LOW 30 MIN: CPT | Performed by: NURSE PRACTITIONER

## 2023-10-04 PROCEDURE — 73562 X-RAY EXAM OF KNEE 3: CPT | Performed by: NURSE PRACTITIONER

## 2023-10-04 NOTE — DISCHARGE INSTRUCTIONS
Ice and elevate the left knee. Wear the ace wrap during the day for support. Follow up as needed with Orthopedics. Return for any concerns.

## 2023-10-04 NOTE — ED INITIAL ASSESSMENT (HPI)
10/4 1140 Pt slipped on water, fell, and twisted her left knee while at work.   +limited ROM d/t pain

## 2025-02-18 ENCOUNTER — APPOINTMENT (OUTPATIENT)
Dept: GENERAL RADIOLOGY | Age: 60
End: 2025-02-18
Attending: NURSE PRACTITIONER
Payer: COMMERCIAL

## 2025-02-18 ENCOUNTER — HOSPITAL ENCOUNTER (OUTPATIENT)
Age: 60
Discharge: HOME | End: 2025-02-18
Payer: COMMERCIAL

## 2025-02-18 VITALS
TEMPERATURE: 98 F | HEIGHT: 63 IN | RESPIRATION RATE: 16 BRPM | SYSTOLIC BLOOD PRESSURE: 157 MMHG | HEART RATE: 73 BPM | OXYGEN SATURATION: 94 % | DIASTOLIC BLOOD PRESSURE: 81 MMHG | BODY MASS INDEX: 25.69 KG/M2 | WEIGHT: 145 LBS

## 2025-02-18 DIAGNOSIS — R06.2 WHEEZING: ICD-10-CM

## 2025-02-18 DIAGNOSIS — R05.1 ACUTE COUGH: Primary | ICD-10-CM

## 2025-02-18 DIAGNOSIS — J06.9 VIRAL URI WITH COUGH: ICD-10-CM

## 2025-02-18 LAB
POCT INFLUENZA A: NEGATIVE
POCT INFLUENZA B: NEGATIVE
SARS-COV-2 RNA RESP QL NAA+PROBE: NOT DETECTED

## 2025-02-18 PROCEDURE — 87502 INFLUENZA DNA AMP PROBE: CPT | Performed by: NURSE PRACTITIONER

## 2025-02-18 PROCEDURE — 99214 OFFICE O/P EST MOD 30 MIN: CPT | Performed by: NURSE PRACTITIONER

## 2025-02-18 PROCEDURE — U0002 COVID-19 LAB TEST NON-CDC: HCPCS | Performed by: NURSE PRACTITIONER

## 2025-02-18 PROCEDURE — 71046 X-RAY EXAM CHEST 2 VIEWS: CPT | Performed by: NURSE PRACTITIONER

## 2025-02-18 PROCEDURE — 94640 AIRWAY INHALATION TREATMENT: CPT | Performed by: NURSE PRACTITIONER

## 2025-02-18 RX ORDER — ALBUTEROL SULFATE 0.83 MG/ML
2.5 SOLUTION RESPIRATORY (INHALATION) ONCE
Status: COMPLETED | OUTPATIENT
Start: 2025-02-18 | End: 2025-02-18

## 2025-02-18 RX ORDER — PREDNISONE 20 MG/1
60 TABLET ORAL ONCE
Status: COMPLETED | OUTPATIENT
Start: 2025-02-18 | End: 2025-02-18

## 2025-02-18 RX ORDER — PREDNISONE 20 MG/1
40 TABLET ORAL DAILY
Qty: 8 TABLET | Refills: 0 | Status: SHIPPED | OUTPATIENT
Start: 2025-02-19 | End: 2025-02-23

## 2025-02-18 RX ORDER — ALBUTEROL SULFATE 90 UG/1
2 INHALANT RESPIRATORY (INHALATION) EVERY 4 HOURS PRN
Qty: 1 EACH | Refills: 0 | Status: SHIPPED | OUTPATIENT
Start: 2025-02-18 | End: 2025-03-20

## 2025-02-18 RX ORDER — IPRATROPIUM BROMIDE AND ALBUTEROL SULFATE 2.5; .5 MG/3ML; MG/3ML
3 SOLUTION RESPIRATORY (INHALATION) ONCE
Status: COMPLETED | OUTPATIENT
Start: 2025-02-18 | End: 2025-02-18

## 2025-02-18 NOTE — DISCHARGE INSTRUCTIONS
Take the medications as prescribed.    Call your primary care doctor in 1 to 2 days to arrange a follow-up appointment.    Go to the emergency department for new or worsening symptoms, including chest pain, increasing shortness of breath, fevers.

## 2025-02-18 NOTE — ED INITIAL ASSESSMENT (HPI)
Pt sts 8 days ago began with cough and congestion. Denies fever. No relief with OTC cold meds. Feeling SOB with activity.

## 2025-02-18 NOTE — ED PROVIDER NOTES
Patient Seen in: [facility]      History     Chief Complaint   Patient presents with    Cough/URI     Stated Complaint: Cold    Subjective:   59-year-old female who started with head congestion on February 10.  States it developed into a cough which seems to have gotten worse.  States she has had pneumonia in the past.  States her  was sick with similar symptoms however has gotten better.  She denies fevers.              Objective:     Past Medical History:    Anesthesia complication    low blood pressure    Dave's esophagus    Benign heart murmur    Difficult intubation    Disorder of thyroid    Esophageal reflux    Pneumonia due to organism    PONV (postoperative nausea and vomiting)    Problems with swallowing    DUE TO PROBLEMATIC INTUBATION 6/4/18    Valvular disease    patient denies    Visual impairment    contacts              Past Surgical History:   Procedure Laterality Date    Appendectomy  2011    Correct bunion,othr methods Bilateral     Endometrial ablation      Eye surgery Bilateral     X4 MUSCLE REPAIR    Removal of tmj condyle  1992    Thyroidectomy      partial                No pertinent social history.            Review of Systems   Constitutional: Negative.    Respiratory:  Positive for cough.    All other systems reviewed and are negative.      Positive for stated complaint: Cold  Other systems are as noted in HPI.  Constitutional and vital signs reviewed.      All other systems reviewed and negative except as noted above.    Physical Exam     ED Triage Vitals [02/18/25 1550]   /88   Pulse 76   Resp 16   Temp 98.2 °F (36.8 °C)   Temp src Oral   SpO2 94 %   O2 Device None (Room air)       Current Vitals:   Vital Signs  BP: 157/81  Pulse: 73  Resp: 16  Temp: 98.2 °F (36.8 °C)  Temp src: Oral    Oxygen Therapy  SpO2: 94 %  O2 Device: None (Room air)        Physical Exam  Vitals and nursing note reviewed.   Constitutional:       General: She is not in acute distress.      Appearance: Normal appearance. She is not ill-appearing, toxic-appearing or diaphoretic.   HENT:      Head:      Jaw: No trismus.      Mouth/Throat:      Lips: Pink.      Mouth: Mucous membranes are moist. No angioedema.      Pharynx: Oropharynx is clear. Uvula midline.   Cardiovascular:      Rate and Rhythm: Normal rate and regular rhythm.   Pulmonary:      Effort: Pulmonary effort is normal.      Breath sounds: Wheezing present.      Comments: Lung sounds slightly diminished, with some rhonchi and wheezing.  Musculoskeletal:      Cervical back: Normal range of motion and neck supple.   Skin:     General: Skin is warm and dry.      Capillary Refill: Capillary refill takes less than 2 seconds.      Coloration: Skin is not jaundiced or pale.   Neurological:      General: No focal deficit present.      Mental Status: She is alert and oriented to person, place, and time.   Psychiatric:         Mood and Affect: Mood normal.         Behavior: Behavior normal.             ED Course     Labs Reviewed   RAPID SARS-COV-2 BY PCR - Normal   POCT FLU TEST - Normal    Narrative:     This assay is a rapid molecular in vitro test utilizing nucleic acid amplification of influenza A and B viral RNA.     XR CHEST PA + LAT CHEST (CPT=71046)    Result Date: 2/18/2025  PROCEDURE:  XR CHEST PA + LAT CHEST (CPT=71046)  INDICATIONS:  Cold  COMPARISON:  [facility], XR, XR CHEST PA + LAT CHEST (CPT=71046), 12/03/2019, 2:44 PM.  TECHNIQUE:  PA and lateral chest radiographs were obtained.  PATIENT STATED HISTORY: (As transcribed by Technologist)  Cough and congestion for eight days.    FINDINGS:  LUNGS:  No focal consolidation.  Normal vascularity. CARDIAC:  Normal size cardiac silhouette. MEDIASTINUM:  Normal. PLEURA:  Normal.  No pleural effusions. BONES:  Normal for age.            CONCLUSION:  No consolidation.   LOCATION:  [facility]   Dictated by (CST): Amado Vega MD on 2/18/2025 at 4:37 PM     Finalized by (CST):  Amado Vega MD on 2/18/2025 at 4:37 PM        ED Course as of 02/18/25 1728  ------------------------------------------------------------  Time: 02/18 1659  Comment: Post nebulizer treatment, patient feels better.  Lungs reassessed, better air movement.  ------------------------------------------------------------  Time: 02/18 1659  Comment: She has used albuterol inhaler in the past which I will prescribe.              MDM              Medical Decision Making  Nontoxic adult female patient, with cough.  Not tachypneic or hypoxic.  No conversational dyspnea.  No complaint of chest pain.  Patient reports  sick with similar symptomology.    I personally viewed, independently interpreted and radiology report was reviewed.  No consolidation or other findings that would suggest pneumonia.  Given her  also sick with similar symptomology, my suspicion is a viral etiology.    Negative flu and COVID    Supportive/home management of diagnosis/illness/injury discussed. Red flag symptoms discussed.  Signs and symptoms/criteria that would necessitate reevaluation, including ER evaluation discussed.  Patient and/or responsible adult verbalize and agree with management and plan of care.    Speech recognition software was used during this dictation.  There may be minor errors in transcription.      Amount and/or Complexity of Data Reviewed  Radiology: ordered and independent interpretation performed. Decision-making details documented in ED Course.  ECG/medicine tests: ordered. Decision-making details documented in ED Course.        Disposition and Plan     Clinical Impression:  1. Acute cough    2. Viral URI with cough    3. Wheezing         Disposition:  Discharge  2/18/2025  5:28 pm    Follow-up:  Your primary care provider    Call in 2 days      The emergency department    Go to   As needed, If symptoms worsen          Medications Prescribed:  Current Discharge Medication List        START taking these  medications    Details   albuterol 108 (90 Base) MCG/ACT Inhalation Aero Soln Inhale 2 puffs into the lungs every 4 (four) hours as needed for Wheezing or Shortness of Breath (Cough, chest tightness).  Qty: 1 each, Refills: 0      predniSONE 20 MG Oral Tab Take 2 tablets (40 mg total) by mouth daily for 4 days.  Qty: 8 tablet, Refills: 0                 Supplementary Documentation:

## 2025-02-20 ENCOUNTER — OFFICE VISIT (OUTPATIENT)
Dept: FAMILY MEDICINE CLINIC | Facility: CLINIC | Age: 60
End: 2025-02-20
Payer: COMMERCIAL

## 2025-02-20 VITALS
WEIGHT: 155 LBS | BODY MASS INDEX: 27 KG/M2 | DIASTOLIC BLOOD PRESSURE: 76 MMHG | HEART RATE: 73 BPM | SYSTOLIC BLOOD PRESSURE: 112 MMHG | TEMPERATURE: 98 F | OXYGEN SATURATION: 98 %

## 2025-02-20 DIAGNOSIS — R05.1 ACUTE COUGH: Primary | ICD-10-CM

## 2025-02-20 DIAGNOSIS — Z87.891 SMOKING HISTORY: ICD-10-CM

## 2025-02-20 PROCEDURE — 99204 OFFICE O/P NEW MOD 45 MIN: CPT | Performed by: FAMILY MEDICINE

## 2025-02-20 PROCEDURE — 3074F SYST BP LT 130 MM HG: CPT | Performed by: FAMILY MEDICINE

## 2025-02-20 PROCEDURE — 3078F DIAST BP <80 MM HG: CPT | Performed by: FAMILY MEDICINE

## 2025-02-20 RX ORDER — BUDESONIDE AND FORMOTEROL FUMARATE DIHYDRATE 160; 4.5 UG/1; UG/1
2 AEROSOL RESPIRATORY (INHALATION) 2 TIMES DAILY
Qty: 1 EACH | Refills: 0 | Status: SHIPPED | OUTPATIENT
Start: 2025-02-20

## 2025-02-20 RX ORDER — ESTRADIOL 0.04 MG/D
0.38 PATCH, EXTENDED RELEASE TRANSDERMAL
COMMUNITY
Start: 2018-10-16

## 2025-02-20 RX ORDER — OMEPRAZOLE 40 MG/1
40 CAPSULE, DELAYED RELEASE ORAL DAILY
COMMUNITY
Start: 2018-11-05

## 2025-02-20 NOTE — PROGRESS NOTES
Chief Complaint   Patient presents with    Urgent Care F/u     Wheezing has subsided, medications prescribed have helped, wanting lungs rechecked, congestion and coughing still, no fevers or sore throat        HPI:    Patient ID: Claribel De Souza is a 59 year old female.    HPI   Patient is new to me and presents as immediate care follow-up for cough:     PMH - hx of pneumonia few years ago, R thyroidectomy (2/2 herthell, not cancer),      Smoked 13yo-49yo (1/2 ppd on average); quit in 2013   No hx of asthma per pt     Was seen in urgent care on 2/18/2025 with URI symptoms and cough for about 10 days at that time.  Chest x-ray was negative, patient was given 5-day course of prednisone and albuterol inhaler.  Having sob with physical exertion and wheezing however these symptoms are improving since steroids   Has been using albuterol inhaler every 4 hours and does think it has been helping  Reports she will get a coughing fit if she is talking for too long  Cough was productive but now is minimal and more dry, no hemoptysis  No chest pain  No fevers; sometimes chills   No GI symptoms   No urinary/bowel issues   No headaches or lightheadedness  No other acute issues reported today    Review of Systems   Constitutional:  Positive for chills. Negative for fever.   HENT:  Negative for ear pain, sinus pressure, sinus pain and sore throat.    Respiratory:  Positive for cough and shortness of breath. Negative for chest tightness and wheezing.    Gastrointestinal: Negative.    Genitourinary: Negative.    Neurological: Negative.            Current Outpatient Medications   Medication Sig Dispense Refill    Omeprazole 40 MG Oral Capsule Delayed Release Take 1 capsule (40 mg total) by mouth daily.      Estradiol 0.0375 MG/24HR Transdermal Patch Biweekly Place 0.375 mg onto the skin.      Budesonide-Formoterol Fumarate (SYMBICORT) 160-4.5 MCG/ACT Inhalation Aerosol Inhale 2 puffs into the lungs 2 (two) times daily. 1 each 0     albuterol 108 (90 Base) MCG/ACT Inhalation Aero Soln Inhale 2 puffs into the lungs every 4 (four) hours as needed for Wheezing or Shortness of Breath (Cough, chest tightness). 1 each 0    predniSONE 20 MG Oral Tab Take 2 tablets (40 mg total) by mouth daily for 4 days. 8 tablet 0    Vitamin D3 1000 units Oral Tab Take 1 tablet (1,000 Units total) by mouth daily.       Allergies:Allergies[1]    HISTORY:  Past Medical History:    Anesthesia complication    low blood pressure    Dave's esophagus    Benign heart murmur    Difficult intubation    Disorder of thyroid    Esophageal reflux    Pneumonia due to organism    PONV (postoperative nausea and vomiting)    Problems with swallowing    DUE TO PROBLEMATIC INTUBATION 16    Valvular disease    patient denies    Visual impairment    contacts      Past Surgical History:   Procedure Laterality Date    Appendectomy      Correct bunion,othr methods Bilateral     Endometrial ablation      Eye surgery Bilateral     X4 MUSCLE REPAIR    Removal of tmj condyle      Thyroidectomy      partial      Family History   Family history unknown: Yes      Social History:   Social History     Socioeconomic History    Marital status:    Tobacco Use    Smoking status: Former     Current packs/day: 0.00     Average packs/day: 1 pack/day for 33.0 years (33.0 ttl pk-yrs)     Types: Cigarettes     Start date: 1980     Quit date: 2013     Years since quittin.4    Smokeless tobacco: Never   Vaping Use    Vaping status: Never Used   Substance and Sexual Activity    Alcohol use: Yes     Alcohol/week: 0.0 - 2.0 standard drinks of alcohol     Comment: social    Drug use: No     Social Drivers of Health      [facility] from [facility], [facility]    Housing Stability        PHYSICAL EXAM:    /76   Pulse 73   Temp 97.8 °F (36.6 °C)   Wt 155 lb (70.3 kg)   SpO2 98%   BMI 27.46 kg/m²    Physical Exam  Constitutional:        Appearance: Normal appearance.   HENT:      Head: Atraumatic.      Right Ear: Tympanic membrane normal.      Left Ear: Tympanic membrane normal.      Nose: Nose normal.      Mouth/Throat:      Mouth: Mucous membranes are moist.      Pharynx: Oropharynx is clear. No oropharyngeal exudate or posterior oropharyngeal erythema.   Eyes:      Extraocular Movements: Extraocular movements intact.      Pupils: Pupils are equal, round, and reactive to light.   Neck:      Thyroid: No thyroid mass, thyromegaly or thyroid tenderness.   Cardiovascular:      Rate and Rhythm: Normal rate and regular rhythm.      Heart sounds: Normal heart sounds.   Pulmonary:      Effort: Pulmonary effort is normal. No respiratory distress.      Breath sounds: Normal breath sounds. No wheezing.      Comments: Slight crackle noted on L mid lung field but no wheezing and pt with good air movement; no increased work of breathing or conversational dyspnea  Musculoskeletal:      Cervical back: Neck supple.      Right lower leg: No edema.      Left lower leg: No edema.   Skin:     General: Skin is warm and dry.      Findings: No rash.   Neurological:      General: No focal deficit present.      Mental Status: She is alert and oriented to person, place, and time.   Psychiatric:         Mood and Affect: Affect normal.         Behavior: Behavior normal.              ASSESSMENT/PLAN:     Assessment & Plan  Acute cough  Viral URI   -Afebrile and vital signs stable, O2 saturation is 98% and lung exam is overall reassuring.  -Patient reports symptoms are improving  -Will add on Symbicort to prednisone and albuterol as needed to help with shortness of breath with physical exertion  -Advised pulmonology follow-up next week given patient's history of smoking  Patient to return to clinic should she have worsening symptoms or new symptoms arise    Patient to schedule a physical in the next month once she is feeling better  Orders:    Budesonide-Formoterol Fumarate  (SYMBICORT) 160-4.5 MCG/ACT Inhalation Aerosol; Inhale 2 puffs into the lungs 2 (two) times daily.    Pulmonary Referral - In Network    Smoking history    Orders:    Budesonide-Formoterol Fumarate (SYMBICORT) 160-4.5 MCG/ACT Inhalation Aerosol; Inhale 2 puffs into the lungs 2 (two) times daily.    Pulmonary Referral - In Network           No follow-ups on file.     Instructions provided as documented in the AVS.    The patient indicated understanding of the diagnosis and agreed with the plan of care.    Red flag s/s reviewed and discussed for conditions listed including concerns for prompt ED evaluation  Medical compliance with plan discussed and risks of non-compliance reviewed  Education completed on disease process, etiology & prognosis  Proper usage and side effects of medications reviewed & discussed    Return to clinic as clinically indicated or as discussed         [1]   Allergies  Allergen Reactions    Codeine NAUSEA AND VOMITING     BUT IS TOLERATING HYDROCODONE W/O ISSUE    Opioid Analgesics NAUSEA AND VOMITING

## 2025-02-20 NOTE — PATIENT INSTRUCTIONS
Patient Instructions:     Inhaler sent to pharmacy   Finish steroids as prescribed  Please schedule pulmonology visit for next week, should you have acutely worsening symptoms or new symptoms arise prior to this visit please give our office a call    ER precautions include acutely worsening shortness of breath, respiratory distress, chest pain    It was a pleasure meeting you today.  Please have labs completed as discussed in a timely manner.  Please take all medications as prescribed and discussed today.  Please have any imaging completed if ordered in time span discussed.    Please return to clinic as discussed, please return sooner as needed for any acute or worsening symptoms.    Dr. [place]

## 2025-06-16 ENCOUNTER — TELEPHONE (OUTPATIENT)
Dept: FAMILY MEDICINE CLINIC | Facility: CLINIC | Age: 60
End: 2025-06-16

## 2025-08-26 ENCOUNTER — OFFICE VISIT (OUTPATIENT)
Dept: FAMILY MEDICINE CLINIC | Facility: CLINIC | Age: 60
End: 2025-08-26

## 2025-08-26 VITALS
SYSTOLIC BLOOD PRESSURE: 110 MMHG | HEART RATE: 68 BPM | OXYGEN SATURATION: 98 % | BODY MASS INDEX: 28 KG/M2 | DIASTOLIC BLOOD PRESSURE: 80 MMHG | WEIGHT: 156 LBS | TEMPERATURE: 98 F

## 2025-08-26 DIAGNOSIS — H61.23 EXCESSIVE EAR WAX, BILATERAL: Primary | ICD-10-CM

## 2025-08-26 PROCEDURE — 3079F DIAST BP 80-89 MM HG: CPT | Performed by: FAMILY MEDICINE

## 2025-08-26 PROCEDURE — 3074F SYST BP LT 130 MM HG: CPT | Performed by: FAMILY MEDICINE

## 2025-08-26 PROCEDURE — 99213 OFFICE O/P EST LOW 20 MIN: CPT | Performed by: FAMILY MEDICINE

## 2025-08-28 ENCOUNTER — NURSE ONLY (OUTPATIENT)
Dept: FAMILY MEDICINE CLINIC | Facility: CLINIC | Age: 60
End: 2025-08-28

## (undated) DEVICE — KENDALL SCD EXPRESS SLEEVES, KNEE LENGTH, MEDIUM: Brand: KENDALL SCD

## (undated) DEVICE — SUTURE SILK 2-0

## (undated) DEVICE — SUTURE SILK 2-0 FS

## (undated) DEVICE — SUTURE PROLENE 4-0 PS-2

## (undated) DEVICE — AMBU ASCOPE 4 BRONCHO REGULAR 5.0/2.2 SINGLE-USE AND FLEXIBLE BROCHOSCOPE. PORTABLE AND STERILE FROM THE PACK. SUITABLE FOR INTUBATION, AIRWAY INSPECTION,TUBE CHECK, BAL & BW PROCEDURES, PDT PROCEDURES BENDING SECTION: 180° UP/180° DOWN INSERT CORD DIAMETER: 5.5 MM, WORKING LENGHT: 600 MM MINIMUM PURCHASE IS 5 PCS = 1 BOX: Brand: ASCOPE™ 4 BRONCHO REGULAR 5.0/2.2FLEXIBLE ENDOSCOPE - SINGLE USE

## (undated) DEVICE — SOL  .9 1000ML BTL

## (undated) DEVICE — 3M™ STERI-STRIP™ REINFORCED ADHESIVE SKIN CLOSURES, R1547, 1/2 IN X 4 IN (12 MM X 100 MM), 6 STRIPS/ENVELOPE: Brand: 3M™ STERI-STRIP™

## (undated) DEVICE — SPONGE RAYTEC 4X4 RF DETECT

## (undated) DEVICE — PAD SACRAL SPAN AID

## (undated) DEVICE — GAUZE SPONGES,USP TYPE VII GAUZE, 12 PLY: Brand: CURITY

## (undated) DEVICE — SUTURE VICRYL 3-0 SH

## (undated) DEVICE — 3M™ TEGADERM™ TRANSPARENT FILM DRESSING, 1626W, 4 IN X 4-3/4 IN (10 CM X 12 CM), 50 EACH/CARTON, 4 CARTON/CASE: Brand: 3M™ TEGADERM™

## (undated) DEVICE — HARMONIC FOCUS SHEARS 9CM LENGTH + ADAPTIVE TISSUE TECHNOLOGY FOR USE WITH BLUE HAND PIECE ONLY: Brand: HARMONIC FOCUS

## (undated) DEVICE — SPONGE: SPECIALTY PEANUT XR 100/CS: Brand: MEDICAL ACTION INDUSTRIES

## (undated) DEVICE — HEAD AND NECK CDS-LF: Brand: MEDLINE INDUSTRIES, INC.

## (undated) DEVICE — SUTURE SILK 3-0

## (undated) DEVICE — PREMIUM WET SKIN PREP TRAY: Brand: MEDLINE INDUSTRIES, INC.

## (undated) DEVICE — GLOVE SURG SENSICARE SZ 7-1/2

## (undated) DEVICE — UNDYED BRAIDED (POLYGLACTIN 910), SYNTHETIC ABSORBABLE SUTURE: Brand: COATED VICRYL

## (undated) DEVICE — PROBE 8225101 5PK STD PRASS FL TIP ROHS

## (undated) DEVICE — AIRWAY ENDO  TRIVANTAGE 7MM

## (undated) DEVICE — CAUTERY NEEDLE 2IN INS E1465

## (undated) NOTE — LETTER
[facility] [place]  [address] [id number]  Dept: [phone]  Dept Fax: [phone]         February 27, 2018    Patient: Jose Enrique Fiore   YOB: 1965   Date of Visit: 2/27/2018       To Whom It May Concern:     Josey Bertrand

## (undated) NOTE — LETTER
03/21/19        Deborah Granite  [address] [address]      Dear Flaquito Singh records indicate that you are due for your yearly Mammogram screening and Annual Physical with Dr. Noe King that  has not yet been completed:     To provide you with th

## (undated) NOTE — LETTER
[address] [facility] [address], [address] [address] [phone]              1/7/2020      Arabella Simpson  [address] [address]          Dear Patient,   According to our records, you are due for an

## (undated) NOTE — LETTER
Date & Time: 12/3/2019, 3:07 PM  Patient: Nafisa Coker  Encounter Provider(s):    SANGEETA Murray       To Whom It May Concern:    Bruna Hager was seen and treated in our department on 12/3/2019. She should not return to work until 12/05/2019.

## (undated) NOTE — LETTER
Date & Time: 2/18/2025, 5:46 PM  Patient: Claribel De Souza  Encounter Provider(s):    Venancio Luque APRN       To Whom It May Concern:    Claribel De Souza was seen and treated in our department on 2/18/2025.   Please allow her to work remote 2/19/2025 and if needed 2/20/2025.     If you have any questions or concerns, please do not hesitate to call.        _____________________________  Physician/APC Signature

## (undated) NOTE — LETTER
Date: 3/8/2018    Patient Name: Cari Lomeli          To Whom it may concern: This letter has been written at the patient's request. The above patient was seen at the [facility] for treatment of a medical condition.     The patient may retu

## (undated) NOTE — LETTER
Date: 3/5/2018    Patient Name: Jose Enrique Fiore          To Whom it may concern: This letter has been written at the patient's request. The above patient was seen at the [facility] for treatment of a medical condition.     This patient should

## (undated) NOTE — LETTER
Date: 3/30/2018    Patient Name: Oumar Godfrey          To Whom it may concern: This letter has been written at the patient's request. The above patient was seen at the [facility] for treatment of multifocal pneumonia and bronchospasm.

## (undated) NOTE — LETTER
Kayce Ray  [address] [place] [id number]        10/04/18        Dear Swetha Baker,    According to our records, you are due for your annual mammography screening. Please contact our office to obtain a mammogram order.  If this letter has been sent in er

## (undated) NOTE — LETTER
Jaymie Zhong MD        I acknowledge that I have been informed of the risk involved by refusing the Urine Pregnancy Test  on [facility].     I, thereb

## (undated) NOTE — LETTER
Last Revised 02/07/06  Obstructive Sleep Apnea Questionnaire    Clinical signs and symptoms suggesting the possibility of RAYMOND    1. Predisposing physical characteristics (positive with any of the following present)  ? BMI 35kg/m²  ?  Craniofacial abnormalit pauses which are frightening to the observer, patient regularly falls asleep within minutes after being left unstimulated) in which case they should be treated as though they have severe sleep apnea.     The sleep laboratory’s assessment (none, mild, modera Point Total for B           C. Requirement for postoperative opioids.                Opioid requirement             Points   None 0    Low dose oral opiod 1    High dose oral opioids, parenteral or neuraxial opiods 3      Point Total for C        Estimation